# Patient Record
Sex: FEMALE | Race: WHITE | Employment: PART TIME
[De-identification: names, ages, dates, MRNs, and addresses within clinical notes are randomized per-mention and may not be internally consistent; named-entity substitution may affect disease eponyms.]

---

## 2020-02-08 ENCOUNTER — HOSPITAL (OUTPATIENT)
Dept: OTHER | Age: 35
End: 2020-02-08

## 2020-02-08 LAB
ABO + RH BLD: NORMAL
ALBUMIN SERPL-MCNC: 3.6 G/DL (ref 3.6–5.1)
ALBUMIN/GLOB SERPL: 0.9 {RATIO} (ref 1–2.4)
ALP SERPL-CCNC: 78 UNITS/L (ref 45–117)
ALT SERPL-CCNC: 20 UNITS/L
ANALYZER ANC (IANC): ABNORMAL
ANION GAP SERPL CALC-SCNC: 10 MMOL/L (ref 10–20)
AST SERPL-CCNC: 13 UNITS/L
BASOPHILS # BLD: 0 K/MCL (ref 0–0.3)
BASOPHILS NFR BLD: 1 %
BILIRUB SERPL-MCNC: 0.2 MG/DL (ref 0.2–1)
BLOOD BANK CMNT PATIENT-IMP: NORMAL
BUN SERPL-MCNC: 10 MG/DL (ref 6–20)
BUN/CREAT SERPL: 21 (ref 7–25)
CALCIUM SERPL-MCNC: 8.5 MG/DL (ref 8.4–10.2)
CHLORIDE SERPL-SCNC: 109 MMOL/L (ref 98–107)
CO2 SERPL-SCNC: 25 MMOL/L (ref 21–32)
CREAT SERPL-MCNC: 0.47 MG/DL (ref 0.51–0.95)
DIFFERENTIAL METHOD BLD: ABNORMAL
EOSINOPHIL # BLD: 0.3 K/MCL (ref 0.1–0.5)
EOSINOPHIL NFR BLD: 4 %
ERYTHROCYTE [DISTWIDTH] IN BLOOD: 20 % (ref 11–15)
GLOBULIN SER-MCNC: 4 G/DL (ref 2–4)
GLUCOSE SERPL-MCNC: 94 MG/DL (ref 65–99)
HCG POINT OF CARE (5HGRST): NEGATIVE
HCT VFR BLD CALC: 22.3 % (ref 36–46.5)
HGB BLD-MCNC: 5.8 G/DL (ref 12–15.5)
HGB BLD-MCNC: ABNORMAL G/DL
HGB BLD-MCNC: ABNORMAL G/DL
IMM GRANULOCYTES # BLD AUTO: 0 K/MCL (ref 0–0.2)
IMM GRANULOCYTES NFR BLD: 0 %
LYMPHOCYTES # BLD: 2.4 K/MCL (ref 1–4.8)
LYMPHOCYTES NFR BLD: 38 %
MCH RBC QN AUTO: 15.2 PG (ref 26–34)
MCHC RBC AUTO-ENTMCNC: 26 G/DL (ref 32–36.5)
MCV RBC AUTO: 58.5 FL (ref 78–100)
MONOCYTES # BLD: 0.6 K/MCL (ref 0.3–0.9)
MONOCYTES NFR BLD: 10 %
NEUTROPHILS # BLD: 3 K/MCL (ref 1.8–7.7)
NEUTROPHILS NFR BLD: 47 %
NEUTS SEG NFR BLD: ABNORMAL %
NRBC (NRBCRE): 0 /100 WBC
NUM BPU REQUESTED: 1
PLATELET # BLD: 388 K/MCL (ref 140–450)
POTASSIUM SERPL-SCNC: 3.7 MMOL/L (ref 3.4–5.1)
PROT SERPL-MCNC: 7.6 G/DL (ref 6.4–8.2)
RBC # BLD: 3.81 MIL/MCL (ref 4–5.2)
SODIUM SERPL-SCNC: 140 MMOL/L (ref 135–145)
WBC # BLD: 6.3 K/MCL (ref 4.2–11)

## 2020-02-08 PROCEDURE — 99284 EMERGENCY DEPT VISIT MOD MDM: CPT | Performed by: EMERGENCY MEDICINE

## 2020-02-09 LAB
ABO + RH BLD: NORMAL
BLD GP AB SCN SERPL QL: NEGATIVE
BLD PROD TYP BPU: NORMAL
BLD UNIT ID BPU: NORMAL
CROSSMATCH EXPIRE: NORMAL
MAJ XM SERPL-IMP: NORMAL
NUM BPU REQUESTED: 1
STATUS OF UNIT: NORMAL
TRANSFUSION STATUS: NORMAL
UNIT DIVISION: 0

## 2021-03-27 ENCOUNTER — TELEPHONE (OUTPATIENT)
Dept: SCHEDULING | Age: 36
End: 2021-03-27

## 2021-08-05 ENCOUNTER — TELEPHONE (OUTPATIENT)
Dept: FAMILY MEDICINE CLINIC | Facility: CLINIC | Age: 36
End: 2021-08-05

## 2021-08-05 ENCOUNTER — OFFICE VISIT (OUTPATIENT)
Dept: FAMILY MEDICINE CLINIC | Facility: CLINIC | Age: 36
End: 2021-08-05
Payer: COMMERCIAL

## 2021-08-05 VITALS
DIASTOLIC BLOOD PRESSURE: 60 MMHG | BODY MASS INDEX: 25.85 KG/M2 | OXYGEN SATURATION: 100 % | HEART RATE: 88 BPM | TEMPERATURE: 98 F | RESPIRATION RATE: 16 BRPM | SYSTOLIC BLOOD PRESSURE: 96 MMHG | HEIGHT: 59 IN | WEIGHT: 128.25 LBS

## 2021-08-05 DIAGNOSIS — D50.9 IRON DEFICIENCY ANEMIA, UNSPECIFIED IRON DEFICIENCY ANEMIA TYPE: ICD-10-CM

## 2021-08-05 DIAGNOSIS — N92.6 IRREGULAR MENSES: Primary | ICD-10-CM

## 2021-08-05 PROCEDURE — 3008F BODY MASS INDEX DOCD: CPT | Performed by: FAMILY MEDICINE

## 2021-08-05 PROCEDURE — 3078F DIAST BP <80 MM HG: CPT | Performed by: FAMILY MEDICINE

## 2021-08-05 PROCEDURE — 3074F SYST BP LT 130 MM HG: CPT | Performed by: FAMILY MEDICINE

## 2021-08-05 PROCEDURE — 99203 OFFICE O/P NEW LOW 30 MIN: CPT | Performed by: FAMILY MEDICINE

## 2021-08-05 RX ORDER — FERROUS SULFATE TAB EC 324 MG (65 MG FE EQUIVALENT) 324 (65 FE) MG
1 TABLET DELAYED RESPONSE ORAL 2 TIMES DAILY
COMMUNITY
Start: 2021-06-11

## 2021-08-05 RX ORDER — GLUCOSAMINE HCL 500 MG
TABLET ORAL
COMMUNITY

## 2021-08-05 RX ORDER — LEVOTHYROXINE SODIUM 0.07 MG/1
75 TABLET ORAL
Qty: 90 TABLET | Refills: 0 | Status: SHIPPED | OUTPATIENT
Start: 2021-08-05 | End: 2021-10-13

## 2021-08-05 RX ORDER — LEVOTHYROXINE SODIUM 0.1 MG/1
100 TABLET ORAL
Qty: 24 TABLET | Refills: 0 | Status: SHIPPED | OUTPATIENT
Start: 2021-08-05 | End: 2021-10-13

## 2021-08-05 RX ORDER — LEVOTHYROXINE SODIUM 0.1 MG/1
100 TABLET ORAL
COMMUNITY
Start: 2021-07-09 | End: 2021-08-05

## 2021-08-05 RX ORDER — LEVOTHYROXINE SODIUM 0.07 MG/1
TABLET ORAL
COMMUNITY
Start: 2021-07-02 | End: 2021-08-05

## 2021-08-11 NOTE — PROGRESS NOTES
[facility] Family [facility] Office Note  Chief Complaint:   Patient presents with:  Physical  New Patient      HPI:   This is a 30year old female coming in to establish care and would like to discuss her issues with her thyroid and her anemia.  He calculated from the following:    Height as of this encounter: 4' 11\" (1.499 m). Weight as of this encounter: 128 lb 4 oz (58.2 kg). Vital signs reviewed. Appears stated age, well groomed.     Physical Exam     GEN: Not in any acute distress, rj list on file for this patient.

## 2021-09-09 ENCOUNTER — OFFICE VISIT (OUTPATIENT)
Dept: FAMILY MEDICINE CLINIC | Facility: CLINIC | Age: 36
End: 2021-09-09
Payer: COMMERCIAL

## 2021-09-09 VITALS
HEART RATE: 90 BPM | TEMPERATURE: 99 F | BODY MASS INDEX: 27 KG/M2 | SYSTOLIC BLOOD PRESSURE: 108 MMHG | OXYGEN SATURATION: 100 % | DIASTOLIC BLOOD PRESSURE: 64 MMHG | RESPIRATION RATE: 16 BRPM | WEIGHT: 131.5 LBS

## 2021-09-09 DIAGNOSIS — R10.32 LLQ PAIN: ICD-10-CM

## 2021-09-09 DIAGNOSIS — N83.209 CYST OF OVARY, UNSPECIFIED LATERALITY: ICD-10-CM

## 2021-09-09 DIAGNOSIS — D75.838 REACTIVE THROMBOCYTOSIS: ICD-10-CM

## 2021-09-09 DIAGNOSIS — D50.9 IRON DEFICIENCY ANEMIA, UNSPECIFIED IRON DEFICIENCY ANEMIA TYPE: ICD-10-CM

## 2021-09-09 DIAGNOSIS — R53.83 OTHER FATIGUE: Primary | ICD-10-CM

## 2021-09-09 LAB
BASOPHILS # BLD AUTO: 0.07 X10(3) UL (ref 0–0.2)
BASOPHILS NFR BLD AUTO: 0.8 %
EOSINOPHIL # BLD AUTO: 0.27 X10(3) UL (ref 0–0.7)
EOSINOPHIL NFR BLD AUTO: 3 %
ERYTHROCYTE [DISTWIDTH] IN BLOOD BY AUTOMATED COUNT: 20.8 %
HCT VFR BLD AUTO: 35.8 %
HGB BLD-MCNC: 10 G/DL
IMM GRANULOCYTES # BLD AUTO: 0.02 X10(3) UL (ref 0–1)
IMM GRANULOCYTES NFR BLD: 0.2 %
LYMPHOCYTES # BLD AUTO: 2.55 X10(3) UL (ref 1–4)
LYMPHOCYTES NFR BLD AUTO: 28.8 %
MCH RBC QN AUTO: 19 PG (ref 26–34)
MCHC RBC AUTO-ENTMCNC: 27.9 G/DL (ref 31–37)
MCV RBC AUTO: 68.1 FL
MONOCYTES # BLD AUTO: 0.84 X10(3) UL (ref 0.1–1)
MONOCYTES NFR BLD AUTO: 9.5 %
NEUTROPHILS # BLD AUTO: 5.11 X10 (3) UL (ref 1.5–7.7)
NEUTROPHILS # BLD AUTO: 5.11 X10(3) UL (ref 1.5–7.7)
NEUTROPHILS NFR BLD AUTO: 57.7 %
PLATELET # BLD AUTO: 469 10(3)UL (ref 150–450)
RBC # BLD AUTO: 5.26 X10(6)UL
WBC # BLD AUTO: 8.9 X10(3) UL (ref 4–11)

## 2021-09-09 PROCEDURE — 3078F DIAST BP <80 MM HG: CPT | Performed by: FAMILY MEDICINE

## 2021-09-09 PROCEDURE — 99214 OFFICE O/P EST MOD 30 MIN: CPT | Performed by: FAMILY MEDICINE

## 2021-09-09 PROCEDURE — 3074F SYST BP LT 130 MM HG: CPT | Performed by: FAMILY MEDICINE

## 2021-09-09 PROCEDURE — 85025 COMPLETE CBC W/AUTO DIFF WBC: CPT | Performed by: FAMILY MEDICINE

## 2021-09-09 NOTE — PROGRESS NOTES
[address] [facility] Family Medicine Office Note  Chief Complaint:   Patient presents with:   Follow - Up      HPI:   This is a 30year old female coming in for follow up   - Hx of ovarian cysts - has not had a recent US   - Hx of IUI treatment in the past mass index is 26.56 kg/m² as calculated from the following:    Height as of 8/5/21: 4' 11\" (1.499 m). Weight as of this encounter: 131 lb 8 oz (59.6 kg). Vital signs reviewed. Appears stated age, well groomed.   Physical Exam     GEN: Not in any acute complications, allergies, or worsening or changing symptoms. Patient is to call with any side effects or complications from the treatments as a result of today. Problem List:  There is no problem list on file for this patient.

## 2021-09-17 ENCOUNTER — OFFICE VISIT (OUTPATIENT)
Dept: FAMILY MEDICINE CLINIC | Facility: CLINIC | Age: 36
End: 2021-09-17
Payer: COMMERCIAL

## 2021-09-17 VITALS
HEART RATE: 102 BPM | DIASTOLIC BLOOD PRESSURE: 56 MMHG | TEMPERATURE: 98 F | OXYGEN SATURATION: 99 % | BODY MASS INDEX: 27 KG/M2 | RESPIRATION RATE: 18 BRPM | WEIGHT: 133.25 LBS | SYSTOLIC BLOOD PRESSURE: 88 MMHG

## 2021-09-17 DIAGNOSIS — N92.1 MENORRHAGIA WITH IRREGULAR CYCLE: Primary | ICD-10-CM

## 2021-09-17 DIAGNOSIS — D50.9 IRON DEFICIENCY ANEMIA, UNSPECIFIED IRON DEFICIENCY ANEMIA TYPE: ICD-10-CM

## 2021-09-17 PROCEDURE — 99213 OFFICE O/P EST LOW 20 MIN: CPT | Performed by: FAMILY MEDICINE

## 2021-09-17 PROCEDURE — 3074F SYST BP LT 130 MM HG: CPT | Performed by: FAMILY MEDICINE

## 2021-09-17 PROCEDURE — 3078F DIAST BP <80 MM HG: CPT | Performed by: FAMILY MEDICINE

## 2021-09-17 RX ORDER — MEDROXYPROGESTERONE ACETATE 10 MG/1
10 TABLET ORAL DAILY
Qty: 10 TABLET | Refills: 0 | Status: SHIPPED | OUTPATIENT
Start: 2021-09-17 | End: 2021-10-06

## 2021-09-17 NOTE — PATIENT INSTRUCTIONS
Rx Provera 10 mg once daily X 10 days   Stay well hydrated   If your bleeding does not stop in 5-7 days and you continue to have a very heavy flow you may need a D & C  Follow up with your OB/Gyne / infertility specialist - make appt with specialist (ref prescribed.            General care  · Get plenty of rest if you tire easily. Avoid heavy exertion. · To help ease pain or cramping, try using a heating pad on the lower belly or back. A warm bath may also help.     Follow-up care  Follow up with your heal

## 2021-09-19 NOTE — PROGRESS NOTES
[address] [facility] Family [facility] Office Note  Chief Complaint:   Patient presents with:  Menstrual Problem      HPI:   This is a 30year old female coming in with excessive bleeding.   Patient notes that her period started on September 3 and initially w tablet (100 mcg total) by mouth twice a week.  SAT & SUN 24 tablet 0      Counseling given: Not Answered       REVIEW OF SYSTEMS:     All other review of systems are negative except for as mentioned in the HPI      EXAM:   BP (!) 88/56   Pulse 102   Temp 98 Patient is in agreement with plan. If the patient has any questions, the patient knows how to contact us at any time.        Meds & Refills for this Visit  Requested Prescriptions     Signed Prescriptions Disp Refills   • medroxyPROGESTERone Acetate 10 MG O

## 2021-10-01 ENCOUNTER — TELEPHONE (OUTPATIENT)
Dept: FAMILY MEDICINE CLINIC | Facility: CLINIC | Age: 36
End: 2021-10-01

## 2021-10-01 NOTE — TELEPHONE ENCOUNTER
----- Message from Florence Rebolledo RN sent at 8/5/2021 12:06 PM CDT -----  Regarding: recall  Recheck CBC

## 2021-10-03 NOTE — TELEPHONE ENCOUNTER
Does not need CBC checked at this time. As long as she has no symptoms (no palpitations, no dizziness or drowsiness, no shortness of breath), and continues to take her Iron supplement twice daily she is ok for now.  She is probably inquiring because she saw

## 2021-10-05 ENCOUNTER — TELEPHONE (OUTPATIENT)
Dept: FAMILY MEDICINE CLINIC | Facility: CLINIC | Age: 36
End: 2021-10-05

## 2021-10-05 NOTE — TELEPHONE ENCOUNTER
Patient states is weak and did not go to work due to weakness.     Future Appointments   Date Time Provider Hailey Latham   10/6/2021  9:30 AM DEANGELO Quintana [facility]iel   10/14/2021  3:00 PM Katia Gandhi   10/18/2021  4:00 PM

## 2021-10-05 NOTE — TELEPHONE ENCOUNTER
Patient called stating that her period came back as heavy as before. She doesn't know what to do in the mean time. Her Ultrasound is schedule for 10/14/21.     Please call back #[phone]

## 2021-10-06 ENCOUNTER — OFFICE VISIT (OUTPATIENT)
Dept: FAMILY MEDICINE CLINIC | Facility: CLINIC | Age: 36
End: 2021-10-06

## 2021-10-06 VITALS
WEIGHT: 133.63 LBS | RESPIRATION RATE: 20 BRPM | SYSTOLIC BLOOD PRESSURE: 110 MMHG | TEMPERATURE: 98 F | HEART RATE: 88 BPM | DIASTOLIC BLOOD PRESSURE: 78 MMHG | BODY MASS INDEX: 27 KG/M2

## 2021-10-06 DIAGNOSIS — N92.1 MENORRHAGIA WITH IRREGULAR CYCLE: Primary | ICD-10-CM

## 2021-10-06 DIAGNOSIS — D50.9 IRON DEFICIENCY ANEMIA, UNSPECIFIED IRON DEFICIENCY ANEMIA TYPE: ICD-10-CM

## 2021-10-06 DIAGNOSIS — R53.83 OTHER FATIGUE: ICD-10-CM

## 2021-10-06 PROCEDURE — 3078F DIAST BP <80 MM HG: CPT | Performed by: NURSE PRACTITIONER

## 2021-10-06 PROCEDURE — 99213 OFFICE O/P EST LOW 20 MIN: CPT | Performed by: NURSE PRACTITIONER

## 2021-10-06 PROCEDURE — 3074F SYST BP LT 130 MM HG: CPT | Performed by: NURSE PRACTITIONER

## 2021-10-06 RX ORDER — MEDROXYPROGESTERONE ACETATE 10 MG/1
10 TABLET ORAL DAILY
Qty: 10 TABLET | Refills: 0 | Status: SHIPPED | OUTPATIENT
Start: 2021-10-06 | End: 2021-10-16

## 2021-10-06 NOTE — PATIENT INSTRUCTIONS
Begin taking oral contraceptive as prescribed for the next 10 days   CBC to be done at [facility].   Continue taking IRON as prescribed   ER precautions for worsening symptoms.    Follow up with OB GYN after US   Call the office as needed with any questions or c count to be sure your anemia has been fixed.   Call 911  Call 911 or seek immediate medical care if any of these occur:  · Shortness of breath or chest pain  · Dizziness or fainting  · Vomiting blood or passing red or black-colored stool   StayWell last rev

## 2021-10-06 NOTE — PROGRESS NOTES
Subjective:   Patient ID: Rosalia Reis is a 30year old female. Patient presents to the clinic today for evaluation of irregular menses. Patient with history of irregular menses with heavy bleeding and iron deficiency anemia.   States last normal period Physical Exam  Vitals and nursing note reviewed. Constitutional:       Appearance: Normal appearance. Cardiovascular:      Rate and Rhythm: Normal rate.    Pulmonary:      Effort: Pulmonary effort is normal.   Abdominal:      Palpations: Abdomen is so

## 2021-10-12 ENCOUNTER — TELEPHONE (OUTPATIENT)
Dept: FAMILY MEDICINE CLINIC | Facility: CLINIC | Age: 36
End: 2021-10-12

## 2021-10-12 NOTE — TELEPHONE ENCOUNTER
Last refilled on 08/05/2021 for # 90 with 0 rf. Last labs 05/10/2021. Last seen on 10/06/2021. Thank you.

## 2021-10-12 NOTE — TELEPHONE ENCOUNTER
Patient called requesting refills for:    Levothyroxine Sodium 75 MCG Oral Tab 90 tablet     Levothyroxine Sodium 100 MCG Oral Tab 24      [id number] - Adela Ryan  [address] AT [facility] OF [address], [phone], [phone]

## 2021-10-13 RX ORDER — LEVOTHYROXINE SODIUM 0.07 MG/1
75 TABLET ORAL
Qty: 90 TABLET | Refills: 0 | Status: SHIPPED | OUTPATIENT
Start: 2021-10-13 | End: 2022-01-11

## 2021-10-13 RX ORDER — LEVOTHYROXINE SODIUM 0.1 MG/1
100 TABLET ORAL
Qty: 24 TABLET | Refills: 0 | Status: SHIPPED | OUTPATIENT
Start: 2021-10-14 | End: 2022-01-12

## 2021-11-09 ENCOUNTER — TELEPHONE (OUTPATIENT)
Dept: FAMILY MEDICINE CLINIC | Facility: CLINIC | Age: 36
End: 2021-11-09

## 2022-09-07 ENCOUNTER — OFFICE VISIT (OUTPATIENT)
Dept: FAMILY MEDICINE CLINIC | Facility: CLINIC | Age: 37
End: 2022-09-07
Payer: COMMERCIAL

## 2022-09-07 VITALS
TEMPERATURE: 98 F | RESPIRATION RATE: 16 BRPM | WEIGHT: 127 LBS | HEIGHT: 58.75 IN | SYSTOLIC BLOOD PRESSURE: 108 MMHG | BODY MASS INDEX: 25.95 KG/M2 | DIASTOLIC BLOOD PRESSURE: 62 MMHG | HEART RATE: 97 BPM | OXYGEN SATURATION: 99 %

## 2022-09-07 DIAGNOSIS — R79.0 LOW FERRITIN LEVEL: ICD-10-CM

## 2022-09-07 DIAGNOSIS — D50.8 OTHER IRON DEFICIENCY ANEMIA: ICD-10-CM

## 2022-09-07 DIAGNOSIS — Z13.220 SCREENING FOR LIPID DISORDERS: ICD-10-CM

## 2022-09-07 DIAGNOSIS — E03.9 HYPOTHYROIDISM (ACQUIRED): ICD-10-CM

## 2022-09-07 DIAGNOSIS — E55.9 VITAMIN D DEFICIENCY: ICD-10-CM

## 2022-09-07 DIAGNOSIS — Z00.00 ANNUAL PHYSICAL EXAM: Primary | ICD-10-CM

## 2022-09-07 DIAGNOSIS — R53.83 OTHER FATIGUE: ICD-10-CM

## 2022-09-07 DIAGNOSIS — Z83.49 FAMILY HISTORY OF THYROID DISEASE: ICD-10-CM

## 2022-09-07 LAB
ALBUMIN SERPL-MCNC: 3.6 G/DL (ref 3.4–5)
ALBUMIN/GLOB SERPL: 0.9 {RATIO} (ref 1–2)
ALP LIVER SERPL-CCNC: 112 U/L
ALT SERPL-CCNC: 48 U/L
ANION GAP SERPL CALC-SCNC: 4 MMOL/L (ref 0–18)
AST SERPL-CCNC: 23 U/L (ref 15–37)
BASOPHILS # BLD AUTO: 0.07 X10(3) UL (ref 0–0.2)
BASOPHILS NFR BLD AUTO: 0.8 %
BILIRUB SERPL-MCNC: 0.2 MG/DL (ref 0.1–2)
BUN BLD-MCNC: 9 MG/DL (ref 7–18)
CALCIUM BLD-MCNC: 8.9 MG/DL (ref 8.5–10.1)
CHLORIDE SERPL-SCNC: 110 MMOL/L (ref 98–112)
CHOLEST SERPL-MCNC: 164 MG/DL (ref ?–200)
CO2 SERPL-SCNC: 23 MMOL/L (ref 21–32)
CREAT BLD-MCNC: 0.63 MG/DL
DEPRECATED HBV CORE AB SER IA-ACNC: 4.3 NG/ML
EOSINOPHIL # BLD AUTO: 0.38 X10(3) UL (ref 0–0.7)
EOSINOPHIL NFR BLD AUTO: 4.2 %
ERYTHROCYTE [DISTWIDTH] IN BLOOD BY AUTOMATED COUNT: 14.6 %
EST. AVERAGE GLUCOSE BLD GHB EST-MCNC: 111 MG/DL (ref 68–126)
FASTING PATIENT LIPID ANSWER: NO
FASTING STATUS PATIENT QL REPORTED: NO
FOLATE SERPL-MCNC: 18.4 NG/ML (ref 8.7–?)
GFR SERPLBLD BASED ON 1.73 SQ M-ARVRAT: 119 ML/MIN/1.73M2 (ref 60–?)
GLOBULIN PLAS-MCNC: 4.1 G/DL (ref 2.8–4.4)
GLUCOSE BLD-MCNC: 113 MG/DL (ref 70–99)
HBA1C MFR BLD: 5.5 % (ref ?–5.7)
HCT VFR BLD AUTO: 36.5 %
HDLC SERPL-MCNC: 52 MG/DL (ref 40–59)
HGB BLD-MCNC: 11.2 G/DL
IMM GRANULOCYTES # BLD AUTO: 0.02 X10(3) UL (ref 0–1)
IMM GRANULOCYTES NFR BLD: 0.2 %
IRON SATN MFR SERPL: 4 %
IRON SERPL-MCNC: 23 UG/DL
LDLC SERPL CALC-MCNC: 102 MG/DL (ref ?–100)
LYMPHOCYTES # BLD AUTO: 2.5 X10(3) UL (ref 1–4)
LYMPHOCYTES NFR BLD AUTO: 27.4 %
MCH RBC QN AUTO: 24.5 PG (ref 26–34)
MCHC RBC AUTO-ENTMCNC: 30.7 G/DL (ref 31–37)
MCV RBC AUTO: 79.9 FL
MONOCYTES # BLD AUTO: 0.77 X10(3) UL (ref 0.1–1)
MONOCYTES NFR BLD AUTO: 8.4 %
NEUTROPHILS # BLD AUTO: 5.39 X10 (3) UL (ref 1.5–7.7)
NEUTROPHILS # BLD AUTO: 5.39 X10(3) UL (ref 1.5–7.7)
NEUTROPHILS NFR BLD AUTO: 59 %
NONHDLC SERPL-MCNC: 112 MG/DL (ref ?–130)
OSMOLALITY SERPL CALC.SUM OF ELEC: 283 MOSM/KG (ref 275–295)
PLATELET # BLD AUTO: 408 10(3)UL (ref 150–450)
POTASSIUM SERPL-SCNC: 3.5 MMOL/L (ref 3.5–5.1)
PROT SERPL-MCNC: 7.7 G/DL (ref 6.4–8.2)
RBC # BLD AUTO: 4.57 X10(6)UL
SODIUM SERPL-SCNC: 137 MMOL/L (ref 136–145)
T4 FREE SERPL-MCNC: 1 NG/DL (ref 0.8–1.7)
TIBC SERPL-MCNC: 575 UG/DL (ref 240–450)
TRANSFERRIN SERPL-MCNC: 386 MG/DL (ref 200–360)
TRIGL SERPL-MCNC: 49 MG/DL (ref 30–149)
TSI SER-ACNC: 7.07 MIU/ML (ref 0.36–3.74)
VIT B12 SERPL-MCNC: 505 PG/ML (ref 193–986)
VIT D+METAB SERPL-MCNC: 57.9 NG/ML (ref 30–100)
VLDLC SERPL CALC-MCNC: 8 MG/DL (ref 0–30)
WBC # BLD AUTO: 9.1 X10(3) UL (ref 4–11)

## 2022-09-07 PROCEDURE — 99395 PREV VISIT EST AGE 18-39: CPT | Performed by: FAMILY MEDICINE

## 2022-09-07 PROCEDURE — 82607 VITAMIN B-12: CPT | Performed by: FAMILY MEDICINE

## 2022-09-07 PROCEDURE — 83540 ASSAY OF IRON: CPT | Performed by: FAMILY MEDICINE

## 2022-09-07 PROCEDURE — 82306 VITAMIN D 25 HYDROXY: CPT | Performed by: FAMILY MEDICINE

## 2022-09-07 PROCEDURE — 3008F BODY MASS INDEX DOCD: CPT | Performed by: FAMILY MEDICINE

## 2022-09-07 PROCEDURE — 3078F DIAST BP <80 MM HG: CPT | Performed by: FAMILY MEDICINE

## 2022-09-07 PROCEDURE — 83036 HEMOGLOBIN GLYCOSYLATED A1C: CPT | Performed by: FAMILY MEDICINE

## 2022-09-07 PROCEDURE — 80061 LIPID PANEL: CPT | Performed by: FAMILY MEDICINE

## 2022-09-07 PROCEDURE — 99213 OFFICE O/P EST LOW 20 MIN: CPT | Performed by: FAMILY MEDICINE

## 2022-09-07 PROCEDURE — 84439 ASSAY OF FREE THYROXINE: CPT | Performed by: FAMILY MEDICINE

## 2022-09-07 PROCEDURE — 82728 ASSAY OF FERRITIN: CPT | Performed by: FAMILY MEDICINE

## 2022-09-07 PROCEDURE — 3074F SYST BP LT 130 MM HG: CPT | Performed by: FAMILY MEDICINE

## 2022-09-07 PROCEDURE — 83550 IRON BINDING TEST: CPT | Performed by: FAMILY MEDICINE

## 2022-09-07 PROCEDURE — 82746 ASSAY OF FOLIC ACID SERUM: CPT | Performed by: FAMILY MEDICINE

## 2022-09-07 PROCEDURE — 80050 GENERAL HEALTH PANEL: CPT | Performed by: FAMILY MEDICINE

## 2022-09-07 RX ORDER — LEVOTHYROXINE SODIUM 75 UG/1
CAPSULE ORAL
COMMUNITY

## 2022-09-07 RX ORDER — LEVOTHYROXINE SODIUM 112 UG/1
112 TABLET ORAL
Qty: 90 TABLET | Refills: 0 | Status: SHIPPED | OUTPATIENT
Start: 2022-09-07 | End: 2022-12-06

## 2022-09-07 RX ORDER — METHOCARBAMOL 750 MG/1
50000 TABLET ORAL
COMMUNITY
Start: 2022-06-09

## 2022-09-07 RX ORDER — NORGESTIMATE AND ETHINYL ESTRADIOL 0.25-0.035
1 KIT ORAL DAILY
COMMUNITY
Start: 2022-08-23

## 2022-09-08 ENCOUNTER — TELEPHONE (OUTPATIENT)
Dept: FAMILY MEDICINE CLINIC | Facility: CLINIC | Age: 37
End: 2022-09-08

## 2022-09-08 NOTE — TELEPHONE ENCOUNTER
----- Message from Daniel Peraza MD sent at 9/7/2022 11:25 PM CDT -----  Ferritin is extremely low. Will benefit from an Iron infusion. Referral placed to see Hematology - Dr Maribell Weir. Oral iron supplement is helping some but not fast enough. TSH is way off (75 mcg is not enough). Recommend increasing dose to 112 mcg once daily - Rx sent. Repeat TSH check in 4 weeks (place recall). Please inform patient of this plan. Provide phone number for Dr Maribell Weir. All other labs are normal.     Follow up in 1 month.        Robbin Medel, [address]  [address] [phone]

## 2022-09-09 NOTE — TELEPHONE ENCOUNTER
Left message to voicemail (per verbal release form consent, confirmed with identifying message.)  Advised patient to call office back [phone] - need to discuss note below.

## 2022-09-12 NOTE — TELEPHONE ENCOUNTER
Advised patient of Doctor's note below. Patient verbalized understanding. No further questions at this time.     Future Appointments   Date Time Provider Hailey Latham   10/7/2022  3:00 PM Daniel Landeros MD [facility]

## 2022-09-23 ENCOUNTER — TELEPHONE (OUTPATIENT)
Dept: FAMILY MEDICINE CLINIC | Facility: CLINIC | Age: 37
End: 2022-09-23

## 2022-09-23 DIAGNOSIS — D50.8 OTHER IRON DEFICIENCY ANEMIA: Primary | ICD-10-CM

## 2022-09-23 NOTE — TELEPHONE ENCOUNTER
Referral entered and faxed to Dr. Darian Nevarez office    Left message for the pt that the referral was faxed

## 2022-10-07 ENCOUNTER — OFFICE VISIT (OUTPATIENT)
Dept: FAMILY MEDICINE CLINIC | Facility: CLINIC | Age: 37
End: 2022-10-07
Payer: COMMERCIAL

## 2022-10-07 VITALS
SYSTOLIC BLOOD PRESSURE: 122 MMHG | OXYGEN SATURATION: 98 % | HEART RATE: 99 BPM | WEIGHT: 129.19 LBS | BODY MASS INDEX: 26.4 KG/M2 | DIASTOLIC BLOOD PRESSURE: 74 MMHG | HEIGHT: 58.75 IN | TEMPERATURE: 97 F

## 2022-10-07 DIAGNOSIS — R79.0 LOW FERRITIN LEVEL: ICD-10-CM

## 2022-10-07 DIAGNOSIS — Z23 NEED FOR VACCINATION: ICD-10-CM

## 2022-10-07 DIAGNOSIS — D50.9 IRON DEFICIENCY ANEMIA, UNSPECIFIED IRON DEFICIENCY ANEMIA TYPE: Primary | ICD-10-CM

## 2022-10-07 DIAGNOSIS — R82.998 FROTHY URINE: ICD-10-CM

## 2022-10-07 DIAGNOSIS — R10.9 LEFT LATERAL ABDOMINAL PAIN: ICD-10-CM

## 2022-10-07 DIAGNOSIS — E03.9 HYPOTHYROIDISM (ACQUIRED): ICD-10-CM

## 2022-10-07 LAB
APPEARANCE: CLEAR
BILIRUBIN: NEGATIVE
GLUCOSE (URINE DIPSTICK): NEGATIVE MG/DL
KETONES (URINE DIPSTICK): NEGATIVE MG/DL
LEUKOCYTES: NEGATIVE
MULTISTIX LOT#: ABNORMAL NUMERIC
NITRITE, URINE: NEGATIVE
PH, URINE: 6.5 (ref 4.5–8)
PROTEIN (URINE DIPSTICK): NEGATIVE MG/DL
SPECIFIC GRAVITY: 1.01 (ref 1–1.03)
T3FREE SERPL-MCNC: 2.71 PG/ML (ref 2.4–4.2)
T4 FREE SERPL-MCNC: 1.5 NG/DL (ref 0.8–1.7)
TSI SER-ACNC: 0.07 MIU/ML (ref 0.36–3.74)
URINE-COLOR: YELLOW
UROBILINOGEN,SEMI-QN: 0.2 MG/DL (ref 0–1.9)

## 2022-10-07 PROCEDURE — 87086 URINE CULTURE/COLONY COUNT: CPT | Performed by: FAMILY MEDICINE

## 2022-10-07 PROCEDURE — 84443 ASSAY THYROID STIM HORMONE: CPT | Performed by: FAMILY MEDICINE

## 2022-10-07 PROCEDURE — 84481 FREE ASSAY (FT-3): CPT | Performed by: FAMILY MEDICINE

## 2022-10-07 PROCEDURE — 84439 ASSAY OF FREE THYROXINE: CPT | Performed by: FAMILY MEDICINE

## 2022-10-10 ENCOUNTER — TELEPHONE (OUTPATIENT)
Dept: FAMILY MEDICINE CLINIC | Facility: CLINIC | Age: 37
End: 2022-10-10

## 2022-10-10 ENCOUNTER — MED REC SCAN ONLY (OUTPATIENT)
Dept: FAMILY MEDICINE CLINIC | Facility: CLINIC | Age: 37
End: 2022-10-10

## 2022-10-10 NOTE — TELEPHONE ENCOUNTER
----- Message from Mydhili Ed Ahumada, MD sent at 10/8/2022  9:06 AM CDT -----  Trace blood noted in the urine - ? Period ? UTI or miniscule kidney stones. Still awaiting culture. TSH is low. Implies overcorrection with current Levothyroxine dose. Go back down to 75 mcg once daily (currently on 112 mcg). Return for RN visit in 4 weeks for recheck TSH w/ reflex after being consistent on 75 mcg once daily.

## 2022-10-12 RX ORDER — LEVOTHYROXINE SODIUM 112 UG/1
112 TABLET ORAL
Qty: 90 TABLET | Refills: 0 | Status: SHIPPED | OUTPATIENT
Start: 2022-10-12 | End: 2023-01-10

## 2022-10-12 NOTE — TELEPHONE ENCOUNTER
Please see Patient message 10/12/2022    LOV 10/07/2022  Last labs 10/07/2022  Last refill on 09/07/2022, for #90 tabs, with 0 refills  levothyroxine 112 MCG Oral Tab  Thyroid Supplements Protocol Failed 10/12/2022 02:50 PM   Protocol Details  TSH value between 0.350 and 5.500 IU/ml    TSH test in past 12 months    Appointment in past 12 or next 3 months     No future appointments. Order(s) pending, please review. Thank you.

## 2022-11-07 RX ORDER — LEVOTHYROXINE SODIUM 75 UG/1
CAPSULE ORAL
Qty: 30 CAPSULE | Refills: 0 | OUTPATIENT
Start: 2022-11-07

## 2022-11-07 RX ORDER — LEVOTHYROXINE SODIUM 112 UG/1
112 TABLET ORAL
Qty: 90 TABLET | Refills: 0 | OUTPATIENT
Start: 2022-11-07 | End: 2023-02-05

## 2022-11-11 ENCOUNTER — PATIENT MESSAGE (OUTPATIENT)
Dept: FAMILY MEDICINE CLINIC | Facility: CLINIC | Age: 37
End: 2022-11-11

## 2022-11-12 NOTE — TELEPHONE ENCOUNTER
LOV 10/07/22  Last labs 10/07/2022  Last refill on 10/12/2022, for #90 tabs, with 0 refills  levothyroxine 112 MCG Oral Tab    Thyroid Supplements Protocol Failed 11/12/2022 08:14 AM   Protocol Details  TSH value between 0.350 and 5.500 IU/ml    TSH test in past 12 months    Appointment in past 12 or next 3 months         No future appointments. Order(s) pending, please review. Thank you.

## 2022-11-12 NOTE — TELEPHONE ENCOUNTER
From: Braulio Easton  To: Daniel Hurtado MD  Sent: 11/11/2022 8:23 PM CST  Subject: Clorinda Gilford dr Kym First it's me abhi i need levothyroxine refill still i take 112mcg

## 2022-11-14 RX ORDER — LEVOTHYROXINE SODIUM 112 UG/1
112 TABLET ORAL
Qty: 90 TABLET | Refills: 0 | Status: SHIPPED | OUTPATIENT
Start: 2022-11-14 | End: 2023-02-12

## 2022-11-21 RX ORDER — LEVOTHYROXINE SODIUM 75 UG/1
75 CAPSULE ORAL DAILY
Qty: 90 CAPSULE | Refills: 0 | Status: SHIPPED | OUTPATIENT
Start: 2022-11-21 | End: 2023-02-19

## 2022-11-21 NOTE — TELEPHONE ENCOUNTER
LOV: 10/7/22   Last Refill:     Looks like dosage change on 11/14/22?   Is pt still to be taking Levothyroxine 75mcg

## 2023-01-05 ENCOUNTER — OFFICE VISIT (OUTPATIENT)
Dept: FAMILY MEDICINE CLINIC | Facility: CLINIC | Age: 38
End: 2023-01-05
Payer: COMMERCIAL

## 2023-01-05 VITALS
DIASTOLIC BLOOD PRESSURE: 76 MMHG | TEMPERATURE: 99 F | BODY MASS INDEX: 26.93 KG/M2 | HEART RATE: 84 BPM | HEIGHT: 58.75 IN | SYSTOLIC BLOOD PRESSURE: 124 MMHG | WEIGHT: 131.81 LBS | OXYGEN SATURATION: 99 %

## 2023-01-05 DIAGNOSIS — E03.9 HYPOTHYROIDISM (ACQUIRED): Primary | ICD-10-CM

## 2023-01-05 DIAGNOSIS — D50.9 IRON DEFICIENCY ANEMIA, UNSPECIFIED IRON DEFICIENCY ANEMIA TYPE: ICD-10-CM

## 2023-01-05 DIAGNOSIS — R79.0 LOW FERRITIN LEVEL: ICD-10-CM

## 2023-01-05 PROCEDURE — 3078F DIAST BP <80 MM HG: CPT | Performed by: FAMILY MEDICINE

## 2023-01-05 PROCEDURE — 84443 ASSAY THYROID STIM HORMONE: CPT | Performed by: FAMILY MEDICINE

## 2023-01-05 PROCEDURE — 99213 OFFICE O/P EST LOW 20 MIN: CPT | Performed by: FAMILY MEDICINE

## 2023-01-05 PROCEDURE — 85025 COMPLETE CBC W/AUTO DIFF WBC: CPT | Performed by: FAMILY MEDICINE

## 2023-01-05 PROCEDURE — 82728 ASSAY OF FERRITIN: CPT | Performed by: FAMILY MEDICINE

## 2023-01-05 PROCEDURE — 84439 ASSAY OF FREE THYROXINE: CPT | Performed by: FAMILY MEDICINE

## 2023-01-05 PROCEDURE — 3008F BODY MASS INDEX DOCD: CPT | Performed by: FAMILY MEDICINE

## 2023-01-05 PROCEDURE — 3074F SYST BP LT 130 MM HG: CPT | Performed by: FAMILY MEDICINE

## 2023-01-06 ENCOUNTER — TELEPHONE (OUTPATIENT)
Dept: FAMILY MEDICINE CLINIC | Facility: CLINIC | Age: 38
End: 2023-01-06

## 2023-01-06 DIAGNOSIS — E03.9 HYPOTHYROIDISM (ACQUIRED): Primary | ICD-10-CM

## 2023-01-06 DIAGNOSIS — R79.0 LOW FERRITIN: ICD-10-CM

## 2023-01-06 DIAGNOSIS — E61.1 LOW IRON: ICD-10-CM

## 2023-01-06 DIAGNOSIS — Z51.81 MEDICATION MONITORING ENCOUNTER: ICD-10-CM

## 2023-01-06 LAB
BASOPHILS # BLD AUTO: 0.09 X10(3) UL (ref 0–0.2)
BASOPHILS NFR BLD AUTO: 1 %
DEPRECATED HBV CORE AB SER IA-ACNC: 3.1 NG/ML
EOSINOPHIL # BLD AUTO: 0.42 X10(3) UL (ref 0–0.7)
EOSINOPHIL NFR BLD AUTO: 4.6 %
ERYTHROCYTE [DISTWIDTH] IN BLOOD BY AUTOMATED COUNT: 14.1 %
HCT VFR BLD AUTO: 33.2 %
HGB BLD-MCNC: 10.1 G/DL
IMM GRANULOCYTES # BLD AUTO: 0.03 X10(3) UL (ref 0–1)
IMM GRANULOCYTES NFR BLD: 0.3 %
LYMPHOCYTES # BLD AUTO: 2.65 X10(3) UL (ref 1–4)
LYMPHOCYTES NFR BLD AUTO: 29.2 %
MCH RBC QN AUTO: 22.7 PG (ref 26–34)
MCHC RBC AUTO-ENTMCNC: 30.4 G/DL (ref 31–37)
MCV RBC AUTO: 74.6 FL
MONOCYTES # BLD AUTO: 0.71 X10(3) UL (ref 0.1–1)
MONOCYTES NFR BLD AUTO: 7.8 %
NEUTROPHILS # BLD AUTO: 5.19 X10 (3) UL (ref 1.5–7.7)
NEUTROPHILS # BLD AUTO: 5.19 X10(3) UL (ref 1.5–7.7)
NEUTROPHILS NFR BLD AUTO: 57.1 %
PLATELET # BLD AUTO: 442 10(3)UL (ref 150–450)
RBC # BLD AUTO: 4.45 X10(6)UL
T4 FREE SERPL-MCNC: 1.1 NG/DL (ref 0.8–1.7)
TSI SER-ACNC: 7.83 MIU/ML (ref 0.36–3.74)
WBC # BLD AUTO: 9.1 X10(3) UL (ref 4–11)

## 2023-01-06 RX ORDER — LEVOTHYROXINE SODIUM 0.1 MG/1
100 TABLET ORAL
Qty: 90 TABLET | Refills: 0 | Status: SHIPPED | OUTPATIENT
Start: 2023-01-06 | End: 2023-04-06

## 2023-01-06 NOTE — TELEPHONE ENCOUNTER
----- Message from Daniel Barber MD sent at 1/6/2023 10:06 AM CST -----  TSH is back and forth - she needs immediate follow up (she has been on 75 mcg since the change made in Nov and did not follow up in time). Please switch her to 100 mcg  once daily (Rx sent) and emphasize the need to return in 4 weeks for a TSH recheck. Her anemia has worsened when compared to her numbers 4 months ago. Ferritin is extremely low - at this time, considering we have tried oral supplementation and dietary changes, I recommend seeing a hematologist - she might be a good candidate for IV iron infusion. Please place referral for her to see Dr Ariana Willingham. This is could be what is interfering with her infertility as well.

## 2023-01-06 NOTE — TELEPHONE ENCOUNTER
Left message to voicemail (per verbal release form consent, confirmed with identifying message.)  Advised patient to call office back [phone] - need to discuss note below.

## 2023-01-09 NOTE — TELEPHONE ENCOUNTER
Advised patient of Doctor's note below. Patient verbalized understanding. No further questions at this time.     Future Appointments   Date Time Provider Hailey Latham   2/8/2023  3:00 PM [facility] NURSE [facility] sent to pt - referral contact info

## 2023-02-13 ENCOUNTER — HOSPITAL ENCOUNTER (OUTPATIENT)
Age: 38
Discharge: HOME | End: 2023-02-13
Payer: COMMERCIAL

## 2023-02-13 VITALS
DIASTOLIC BLOOD PRESSURE: 79 MMHG | SYSTOLIC BLOOD PRESSURE: 113 MMHG | RESPIRATION RATE: 18 BRPM | HEIGHT: 59 IN | WEIGHT: 138 LBS | OXYGEN SATURATION: 98 % | HEART RATE: 106 BPM | BODY MASS INDEX: 27.82 KG/M2 | TEMPERATURE: 98 F

## 2023-02-13 DIAGNOSIS — J02.9 PHARYNGITIS, UNSPECIFIED ETIOLOGY: Primary | ICD-10-CM

## 2023-02-13 LAB
S PYO AG THROAT QL: NEGATIVE
SARS-COV-2 RNA RESP QL NAA+PROBE: NOT DETECTED

## 2023-02-13 PROCEDURE — 99213 OFFICE O/P EST LOW 20 MIN: CPT | Performed by: NURSE PRACTITIONER

## 2023-02-13 PROCEDURE — 87880 STREP A ASSAY W/OPTIC: CPT | Performed by: NURSE PRACTITIONER

## 2023-02-13 PROCEDURE — U0002 COVID-19 LAB TEST NON-CDC: HCPCS | Performed by: NURSE PRACTITIONER

## 2023-02-13 NOTE — DISCHARGE INSTRUCTIONS
Tylenol and Motrin alternating. Use salt water gargles and throat lozenges as needed for pain. Follow-up with your primary care doctor as needed. Increase oral fluids.

## 2023-03-08 ENCOUNTER — OFFICE VISIT (OUTPATIENT)
Dept: FAMILY MEDICINE CLINIC | Facility: CLINIC | Age: 38
End: 2023-03-08
Payer: COMMERCIAL

## 2023-03-08 VITALS
BODY MASS INDEX: 26.28 KG/M2 | OXYGEN SATURATION: 100 % | DIASTOLIC BLOOD PRESSURE: 62 MMHG | TEMPERATURE: 98 F | HEART RATE: 90 BPM | HEIGHT: 59 IN | WEIGHT: 130.38 LBS | SYSTOLIC BLOOD PRESSURE: 98 MMHG

## 2023-03-08 DIAGNOSIS — R79.0 LOW FERRITIN: ICD-10-CM

## 2023-03-08 DIAGNOSIS — E03.8 OTHER SPECIFIED HYPOTHYROIDISM: Primary | ICD-10-CM

## 2023-03-08 DIAGNOSIS — D50.9 IRON DEFICIENCY ANEMIA, UNSPECIFIED IRON DEFICIENCY ANEMIA TYPE: ICD-10-CM

## 2023-03-08 LAB
T4 FREE SERPL-MCNC: 1.1 NG/DL (ref 0.8–1.7)
TSI SER-ACNC: 0.29 MIU/ML (ref 0.36–3.74)

## 2023-03-08 PROCEDURE — 84443 ASSAY THYROID STIM HORMONE: CPT | Performed by: FAMILY MEDICINE

## 2023-03-08 PROCEDURE — 3074F SYST BP LT 130 MM HG: CPT | Performed by: FAMILY MEDICINE

## 2023-03-08 PROCEDURE — 3078F DIAST BP <80 MM HG: CPT | Performed by: FAMILY MEDICINE

## 2023-03-08 PROCEDURE — 99213 OFFICE O/P EST LOW 20 MIN: CPT | Performed by: FAMILY MEDICINE

## 2023-03-08 PROCEDURE — 84439 ASSAY OF FREE THYROXINE: CPT | Performed by: FAMILY MEDICINE

## 2023-03-08 PROCEDURE — 3008F BODY MASS INDEX DOCD: CPT | Performed by: FAMILY MEDICINE

## 2023-03-09 ENCOUNTER — TELEPHONE (OUTPATIENT)
Dept: FAMILY MEDICINE CLINIC | Facility: CLINIC | Age: 38
End: 2023-03-09

## 2023-03-09 RX ORDER — LEVOTHYROXINE SODIUM 88 UG/1
88 TABLET ORAL
Qty: 90 TABLET | Refills: 0 | Status: SHIPPED | OUTPATIENT
Start: 2023-03-09 | End: 2023-06-07

## 2023-03-09 NOTE — TELEPHONE ENCOUNTER
Advised patient of DEANGELO's note below. Patient verbalized understanding. No further questions at this time.     Recall placed repeat thyroid in 6 weeks  Lab order in place

## 2023-03-09 NOTE — TELEPHONE ENCOUNTER
----- Message from DEANGELO Tucker sent at 3/9/2023  9:09 AM CST -----  Need to lower Levothyroxine dose. New Rx sent to pharmacy.  Needs repeat labs in 6 weeks

## 2023-03-14 ENCOUNTER — OFFICE VISIT (OUTPATIENT)
Dept: HEMATOLOGY/ONCOLOGY | Facility: HOSPITAL | Age: 38
End: 2023-03-14
Attending: INTERNAL MEDICINE
Payer: COMMERCIAL

## 2023-03-14 VITALS
BODY MASS INDEX: 26.08 KG/M2 | WEIGHT: 129.38 LBS | RESPIRATION RATE: 18 BRPM | HEART RATE: 99 BPM | HEIGHT: 58.9 IN | TEMPERATURE: 98 F | OXYGEN SATURATION: 99 % | DIASTOLIC BLOOD PRESSURE: 76 MMHG | SYSTOLIC BLOOD PRESSURE: 115 MMHG

## 2023-03-14 DIAGNOSIS — D50.0 IRON DEFICIENCY ANEMIA DUE TO CHRONIC BLOOD LOSS: Primary | ICD-10-CM

## 2023-03-14 DIAGNOSIS — E03.9 HYPOTHYROIDISM, UNSPECIFIED TYPE: ICD-10-CM

## 2023-03-14 PROCEDURE — 99205 OFFICE O/P NEW HI 60 MIN: CPT | Performed by: INTERNAL MEDICINE

## 2023-03-14 NOTE — PROGRESS NOTES
Education Record    Learner:  Patient    Disease / Diagnosis: Anemia    Barriers / Limitations:  None   Comments:    Method:  Discussion   Comments:    General Topics:  Medication, Side effects and symptom management and Plan of care reviewed   Comments:    Outcome:  Shows understanding   Comments:    Here for new consult- iron deficiency anemia. Very low energy. No shortness of breath. No abnormal bleeding. Irregular periods which she follows with ob/gyne for. Will set up for some IV iron. 160

## 2023-03-15 ENCOUNTER — TELEPHONE (OUTPATIENT)
Dept: HEMATOLOGY/ONCOLOGY | Facility: HOSPITAL | Age: 38
End: 2023-03-15

## 2023-03-15 NOTE — TELEPHONE ENCOUNTER
----- Message from Franco Carey RN sent at 3/15/2023  8:51 AM CDT -----  Hello-    Sorry for all of the confusion before about which iron product. I was told like 4 different things. She will need 2 doses, one week apart, of feraheme. It will be about 1hr appointment.     Thank you,  Rj

## 2023-03-21 ENCOUNTER — OFFICE VISIT (OUTPATIENT)
Dept: HEMATOLOGY/ONCOLOGY | Facility: HOSPITAL | Age: 38
End: 2023-03-21
Attending: INTERNAL MEDICINE
Payer: COMMERCIAL

## 2023-03-21 VITALS
BODY MASS INDEX: 26.33 KG/M2 | HEART RATE: 80 BPM | SYSTOLIC BLOOD PRESSURE: 99 MMHG | HEIGHT: 58.9 IN | OXYGEN SATURATION: 99 % | TEMPERATURE: 98 F | WEIGHT: 130.63 LBS | DIASTOLIC BLOOD PRESSURE: 63 MMHG | RESPIRATION RATE: 16 BRPM

## 2023-03-21 DIAGNOSIS — D50.0 IRON DEFICIENCY ANEMIA DUE TO CHRONIC BLOOD LOSS: Primary | ICD-10-CM

## 2023-03-21 PROCEDURE — 96374 THER/PROPH/DIAG INJ IV PUSH: CPT

## 2023-03-21 NOTE — PROGRESS NOTES
Education Record    Learner:  Patient    Disease / Diagnosis: ROSA    Barriers / Limitations:  None   Comments:    Method:  Brief focused   Comments:    General Topics:  Plan of care reviewed   Comments:    Outcome:  Shows understanding   Comments: Feraheme infused without any complications. Observed for half hour after infusion. Vital signs taken at the end of the 30-minute observation. Patient denied any complaints/issues. Discharged in good condition. Advised to call for any questions/concerns/issues.

## 2023-03-22 ENCOUNTER — APPOINTMENT (OUTPATIENT)
Dept: HEMATOLOGY/ONCOLOGY | Age: 38
End: 2023-03-22
Payer: COMMERCIAL

## 2023-03-28 ENCOUNTER — OFFICE VISIT (OUTPATIENT)
Dept: HEMATOLOGY/ONCOLOGY | Facility: HOSPITAL | Age: 38
End: 2023-03-28
Attending: INTERNAL MEDICINE
Payer: COMMERCIAL

## 2023-03-28 VITALS
RESPIRATION RATE: 16 BRPM | DIASTOLIC BLOOD PRESSURE: 65 MMHG | SYSTOLIC BLOOD PRESSURE: 105 MMHG | HEART RATE: 80 BPM | TEMPERATURE: 98 F | OXYGEN SATURATION: 99 %

## 2023-03-28 DIAGNOSIS — D50.0 IRON DEFICIENCY ANEMIA DUE TO CHRONIC BLOOD LOSS: Primary | ICD-10-CM

## 2023-03-28 PROCEDURE — 96374 THER/PROPH/DIAG INJ IV PUSH: CPT

## 2023-03-28 NOTE — PROGRESS NOTES
Education Record    Learner:  Patient    Disease / Diagnosis: feraheme infusion     Barriers / Limitations:  None   Comments:    Method:  Brief focused   Comments:    General Topics:  Plan of care reviewed   Comments:    Outcome:  Shows understanding   Comments: Tolerated well without issue. Vitals taken 30 minutes post infusion.

## 2023-04-20 ENCOUNTER — TELEPHONE (OUTPATIENT)
Dept: FAMILY MEDICINE CLINIC | Facility: CLINIC | Age: 38
End: 2023-04-20

## 2023-04-20 NOTE — TELEPHONE ENCOUNTER
Letter mailed to patient reminding her she is due for labs per pt reminder.     Lab Frequency Next Occurrence   TSH W REFLEX TO FREE T4 Once 02/06/2023         Trevon Reynoso RN  Daniel Rob Nurse  Repeat thyroid labs in 6 weeks

## 2023-07-17 ENCOUNTER — OFFICE VISIT (OUTPATIENT)
Dept: FAMILY MEDICINE CLINIC | Facility: CLINIC | Age: 38
End: 2023-07-17
Payer: COMMERCIAL

## 2023-07-17 VITALS
HEART RATE: 77 BPM | OXYGEN SATURATION: 97 % | RESPIRATION RATE: 16 BRPM | SYSTOLIC BLOOD PRESSURE: 96 MMHG | TEMPERATURE: 98 F | BODY MASS INDEX: 26.21 KG/M2 | HEIGHT: 59 IN | WEIGHT: 130 LBS | DIASTOLIC BLOOD PRESSURE: 56 MMHG

## 2023-07-17 DIAGNOSIS — E03.9 HYPOTHYROIDISM (ACQUIRED): Primary | ICD-10-CM

## 2023-07-17 DIAGNOSIS — H61.23 BILATERAL IMPACTED CERUMEN: ICD-10-CM

## 2023-07-17 LAB — TSI SER-ACNC: 0.38 MIU/ML (ref 0.36–3.74)

## 2023-07-17 PROCEDURE — 84443 ASSAY THYROID STIM HORMONE: CPT | Performed by: NURSE PRACTITIONER

## 2023-07-17 RX ORDER — LEVOTHYROXINE SODIUM 88 UG/1
TABLET ORAL
COMMUNITY
Start: 2023-07-12

## 2023-07-18 ENCOUNTER — TELEPHONE (OUTPATIENT)
Dept: FAMILY MEDICINE CLINIC | Facility: CLINIC | Age: 38
End: 2023-07-18

## 2023-07-18 DIAGNOSIS — E03.9 HYPOTHYROIDISM (ACQUIRED): Primary | ICD-10-CM

## 2023-07-18 RX ORDER — LEVOTHYROXINE SODIUM 88 UG/1
88 TABLET ORAL DAILY
Qty: 90 TABLET | Refills: 0 | Status: SHIPPED | OUTPATIENT
Start: 2023-07-18

## 2023-07-18 NOTE — TELEPHONE ENCOUNTER
----- Message from DEANGELO Ibrahim sent at 7/18/2023  7:57 AM CDT -----  TSH - thyroid is functioning normally, continue current dose of levothyroxine, repeat tsh in 3 months - order placed for October 2023

## 2023-07-18 NOTE — TELEPHONE ENCOUNTER
Endoscopy Discharge Summary      Admit Date: 8/23/2018    Discharge Date and Time:  8/23/2018 8:40 AM    Attending Physician: Dennis Jorge MD     Discharge Physician: Dennis Jorge MD     Principal Admitting Diagnoses: History of adenomatous polyp of colon         Discharge Diagnosis: The primary encounter diagnosis was History of adenomatous polyp of colon. Diagnoses of Family history of colon cancer, Special screening for malignant neoplasms, colon, and Polyp of colon, unspecified part of colon, unspecified type were also pertinent to this visit.     Discharged Condition: Good    Indication for Admission: History of adenomatous polyp of colon     Hospital Course: Patient was admitted for an inpatient procedure and tolerated the procedure well with no complications.    Significant Diagnostic Studies: Colonoscopy with cold biopsy polypectomy    Pathology (if any):  Specimen (12h ago, onward)    Start     Ordered    08/23/18 0837  Specimen to Pathology - Surgery  Once     Comments:  1. Sigmoid 1mm sessile polyp     Start Status   08/23/18 0837 Collected (08/23/18 0838)       08/23/18 0838          Estimated Blood Loss: 1 ml.    Discussed with: patient and family.    Disposition: Home.    Follow Up/Patient Instructions:   Current Discharge Medication List      CONTINUE these medications which have NOT CHANGED    Details   benazepril (LOTENSIN) 10 MG tablet TAKE ONE TABLET BY MOUTH ONCE DAILY  Qty: 90 tablet, Refills: 3      calcium carbonate-vitamin D3 (CALTRATE 600 + D) 600 mg(1,500mg) -400 unit Chew Take by mouth.        HUMALOG KWIKPEN INSULIN 100 unit/mL InPn pen INJECT 10 UNITS SUBCUTANEOUSLY THREE TIMES DAILY BEFORE  MEALS  Qty: 1 Box, Refills: 3      insulin glargine (LANTUS SOLOSTAR U-100 INSULIN) 100 unit/mL (3 mL) InPn pen Inject 30 Units into the skin once daily.  Qty: 30 mL, Refills: 11    Associated Diagnoses: Type 1 diabetes mellitus without complication      lovastatin (MEVACOR) 20 MG tablet  Attempted to call pt - pt not available at this time, advised pt to call office back - she v/u "TAKE ONE TABLET BY MOUTH ONCE DAILY AT  NIGHT.  Qty: 90 tablet, Refills: 3      metFORMIN (GLUCOPHAGE-XR) 500 MG 24 hr tablet TAKE FOUR TABLETS BY MOUTH ONCE DAILY  Qty: 120 tablet, Refills: 3    Comments: Please consider 90 day supplies to promote better adherence  Associated Diagnoses: Type 1 diabetes mellitus without complication      blood-glucose meter Misc Test blood sugar twice daily Dx: 250.00 diabetes mellitus  Qty: 1 each, Refills: 0    Associated Diagnoses: Type 2 diabetes mellitus without complication      FREESTYLE LANCETS 28 gauge lancets USE ONE  TO CHECK GLUCOSE THREE TIMES DAILY  Qty: 100 each, Refills: 11    Comments: Please consider 90 day supplies to promote better adherence      FREESTYLE LITE STRIPS Strp USE ONE STRIP TO CHECK GLUCOSE THREE TIMES DAILY  Qty: 100 each, Refills: 11    Comments: Please consider 90 day supplies to promote better adherence      pen needle, diabetic 31 gauge x 1/4" Ndle USE TO STICK INTO THE SKIN 5 TIMES DAILY  Qty: 200 each, Refills: 11    Comments: Please consider 90 day supplies to promote better adherence  Associated Diagnoses: Type 1 diabetes mellitus without complication             Discharge Procedure Orders   Diet general     Call MD for:  temperature >100.4     Call MD for:  persistent nausea and vomiting     Call MD for:  severe uncontrolled pain     Call MD for:  difficulty breathing, headache or visual disturbances     Call MD for:  redness, tenderness, or signs of infection (pain, swelling, redness, odor or green/yellow discharge around incision site)     Call MD for:  hives     Call MD for:  persistent dizziness or light-headedness     No dressing needed       Follow-up Information     Dennis Jorge MD. Call in 1 week.    Specialty:  Family Medicine  Why:  To receive pathology results.  Contact information:  [id number] [facility] [address] [phone]                   [email or web address]      "

## 2023-07-18 NOTE — TELEPHONE ENCOUNTER
Advised patient of APRN's note below and Rx sent today. Patient verbalized understanding. No further questions at this time.     Future Appointments   Date Time Provider Hailey Latham   10/19/2023  2:00 PM  [facility] [facility] EMG Floretta Finger     Future lab in place

## 2023-07-24 NOTE — TELEPHONE ENCOUNTER
LOV 08/05/2021  Last labs 05/10/2021  Last refill on (Historical), for #?, with ? refills  Levothyroxine Sodium 75 MCG Oral Tab    Last refill on (Historical), for #?, with ? refills  Levothyroxine Sodium 100 MCG Oral Tab    No future appointments.     Conf
24-Jul-2023 09:00

## 2023-10-19 ENCOUNTER — NURSE ONLY (OUTPATIENT)
Dept: FAMILY MEDICINE CLINIC | Facility: CLINIC | Age: 38
End: 2023-10-19
Payer: COMMERCIAL

## 2023-10-19 DIAGNOSIS — E03.9 HYPOTHYROIDISM (ACQUIRED): ICD-10-CM

## 2023-10-19 LAB — TSI SER-ACNC: 2.47 MIU/ML (ref 0.36–3.74)

## 2023-10-19 PROCEDURE — 84443 ASSAY THYROID STIM HORMONE: CPT | Performed by: NURSE PRACTITIONER

## 2023-10-19 NOTE — PROGRESS NOTES
Patient to clinic for labs per [facility] APRN  1 tubes drawn lt green 1 attempt  Patient left office in stable condition

## 2023-10-20 ENCOUNTER — TELEPHONE (OUTPATIENT)
Dept: FAMILY MEDICINE CLINIC | Facility: CLINIC | Age: 38
End: 2023-10-20

## 2023-10-20 NOTE — TELEPHONE ENCOUNTER
Spoke with patient - given DEANGELO Choudhury recommendations re: labs    TSH - normal thyroid with use of rx, continue 88mcg daily and plan on recheck in 1 year.  Rx sent with refills     Pt verbalized understanding

## 2023-10-20 NOTE — TELEPHONE ENCOUNTER
----- Message from DEANGELO Leonard sent at 10/20/2023  8:45 AM CDT -----  TSH - normal thyroid with use of rx, continue 88mcg daily and plan on recheck in 1 year.  Rx sent with refills

## 2023-11-02 ENCOUNTER — LAB SERVICES (OUTPATIENT)
Dept: LAB | Age: 38
End: 2023-11-02

## 2023-11-02 DIAGNOSIS — N83.209 UNSPECIFIED OVARIAN CYST, UNSPECIFIED SIDE: Primary | ICD-10-CM

## 2023-11-02 PROCEDURE — 36415 COLL VENOUS BLD VENIPUNCTURE: CPT | Performed by: INTERNAL MEDICINE

## 2023-11-02 PROCEDURE — 81503 ONCO (OVAR) FIVE PROTEINS: CPT | Performed by: CLINICAL MEDICAL LABORATORY

## 2023-11-07 LAB
CANCER AG125 SERPL-ACNC: 10
LABORATORY REPORT: NORMAL
MALIGNANCY ASSESSMENT, OVERA SCORE: NORMAL
MALIGNANCY RISK, PELVIC MASS, OVA1 SCORE: 3.9
MENOPAUSE STATUS: NORMAL

## 2024-04-01 ENCOUNTER — TELEPHONE (OUTPATIENT)
Dept: FAMILY MEDICINE CLINIC | Facility: CLINIC | Age: 39
End: 2024-04-01

## 2024-05-21 ENCOUNTER — PATIENT OUTREACH (OUTPATIENT)
Dept: FAMILY MEDICINE CLINIC | Facility: CLINIC | Age: 39
End: 2024-05-21

## 2024-08-07 ENCOUNTER — HOSPITAL ENCOUNTER (OUTPATIENT)
Age: 39
Discharge: ANOTHER HOSPITAL | End: 2024-08-07
Payer: COMMERCIAL

## 2024-08-07 VITALS
OXYGEN SATURATION: 98 % | SYSTOLIC BLOOD PRESSURE: 123 MMHG | RESPIRATION RATE: 18 BRPM | BODY MASS INDEX: 27 KG/M2 | TEMPERATURE: 98 F | HEART RATE: 84 BPM | WEIGHT: 132 LBS | DIASTOLIC BLOOD PRESSURE: 92 MMHG

## 2024-08-07 DIAGNOSIS — R10.30 LOWER ABDOMINAL PAIN: Primary | ICD-10-CM

## 2024-08-07 PROCEDURE — 99215 OFFICE O/P EST HI 40 MIN: CPT | Performed by: NURSE PRACTITIONER

## 2024-08-08 NOTE — ED INITIAL ASSESSMENT (HPI)
Pt with c/o lower abdominal pain that started this afternoon.  Pt denies any nausea, vomiting or diarrhea.  Pt denies fevers.

## 2024-08-08 NOTE — ED PROVIDER NOTES
Patient Seen in: [facility]      History     Chief Complaint   Patient presents with    Abdomen/Flank Pain     Stated Complaint: lower abdomin pain    Subjective:   38-year-old female presents with complaint of severe lower abdominal pain that began this afternoon.  Pain is radiating from the right lower quadrant all the way across to the left lower quadrant.  Patient states she has not had pain like this before.  Last BM was today.  Normal formed and brown.  Patient has not taken any medication for the pain.  LMP 7/18/24- unsure if pregnant.     Patient denies fever, chills, vomiting, nausea, chest pain, shortness of breath, dizziness, dysuria, frequency, hematuria, back pain.      The history is provided by the patient and a relative.           Objective:   Past Medical History:    Anemia    Thyroid disease              History reviewed. No pertinent surgical history.             Social History     Socioeconomic History    Marital status:    Tobacco Use    Smoking status: Never    Smokeless tobacco: Never   Vaping Use    Vaping status: Never Used   Substance and Sexual Activity    Alcohol use: Never    Drug use: Never              Review of Systems   Constitutional:  Negative for chills and fever.   Gastrointestinal:  Positive for abdominal pain. Negative for abdominal distention, nausea and vomiting.   Genitourinary:  Negative for dysuria, flank pain and hematuria.   Skin:  Negative for rash.   Neurological:  Negative for dizziness and light-headedness.       Positive for stated Chief Complaint: Abdomen/Flank Pain    Other systems are as noted in HPI.  Constitutional and vital signs reviewed.      All other systems reviewed and negative except as noted above.    Physical Exam     ED Triage Vitals [08/07/24 1933]   BP (!) 123/92   Pulse 84   Resp 18   Temp 97.6 °F (36.4 °C)   Temp src Temporal   SpO2 98 %   O2 Device None (Room air)       Current Vitals:   Vital Signs  BP: (!) 123/92  Pulse:  84  Resp: 18  Temp: 97.6 °F (36.4 °C)  Temp src: Temporal    Oxygen Therapy  SpO2: 98 %  O2 Device: None (Room air)            Physical Exam  Constitutional:       General: She is in acute distress.      Appearance: She is well-developed. She is not diaphoretic.   HENT:      Head: Normocephalic and atraumatic.   Cardiovascular:      Rate and Rhythm: Normal rate and regular rhythm.   Pulmonary:      Effort: Pulmonary effort is normal.      Breath sounds: Normal breath sounds.   Abdominal:      General: Abdomen is flat. Bowel sounds are normal. There are no signs of injury.      Palpations: Abdomen is soft.      Tenderness: There is abdominal tenderness in the right lower quadrant, periumbilical area and left lower quadrant. There is no right CVA tenderness or left CVA tenderness.   Skin:     General: Skin is warm and dry.   Neurological:      Mental Status: She is alert.   Psychiatric:         Mood and Affect: Mood is anxious.               ED Course   Labs Reviewed - No data to display      MDM                Medical Decision Making  38-year-old female presents with complaint of severe lower abdominal pain that began this afternoon.  Diff dx include appendicitis vs ovarian torsion vs ectopic pregnancy vs diverticulitis vs other.  On exam,pt is very uncomfortable, very tender lower abdomen- right/left quadrants, vitals are normal at this time.   Unable to provide urine sample.   Reviewed with the patient the limitations of this immediate care, including inability to perform advanced imaging at this hour. For this reason I recommend the patient go to the emergency department for further evaluation and care.   Pt's relative who is in the room prefers to drive pt.   Plan to go to [place] ED.   If symptoms worsen, stop, pull over and call 911.     Amount and/or Complexity of Data Reviewed  External Data Reviewed: notes.        Disposition and Plan     Clinical Impression:  1. Lower abdominal pain          Disposition:  Ic to ed  8/7/2024  7:40 pm    Follow-up:  No follow-up provider specified.        Medications Prescribed:  Discharge Medication List as of 8/7/2024  7:43 PM

## 2024-08-14 ENCOUNTER — OFFICE VISIT (OUTPATIENT)
Dept: FAMILY MEDICINE CLINIC | Facility: CLINIC | Age: 39
End: 2024-08-14
Payer: COMMERCIAL

## 2024-08-14 VITALS
SYSTOLIC BLOOD PRESSURE: 100 MMHG | BODY MASS INDEX: 27.54 KG/M2 | HEART RATE: 84 BPM | OXYGEN SATURATION: 99 % | RESPIRATION RATE: 18 BRPM | DIASTOLIC BLOOD PRESSURE: 60 MMHG | WEIGHT: 133 LBS | TEMPERATURE: 98 F | HEIGHT: 58.27 IN

## 2024-08-14 DIAGNOSIS — E55.9 HYPOVITAMINOSIS D: ICD-10-CM

## 2024-08-14 DIAGNOSIS — Z31.41 FERTILITY TESTING: ICD-10-CM

## 2024-08-14 DIAGNOSIS — Z87.42 HISTORY OF PCOS: ICD-10-CM

## 2024-08-14 DIAGNOSIS — N83.202 CYST OF LEFT OVARY: ICD-10-CM

## 2024-08-14 DIAGNOSIS — H61.23 BILATERAL IMPACTED CERUMEN: ICD-10-CM

## 2024-08-14 DIAGNOSIS — Z00.00 WELL ADULT EXAM: Primary | ICD-10-CM

## 2024-08-14 DIAGNOSIS — E07.9 THYROID DISEASE: ICD-10-CM

## 2024-08-14 PROCEDURE — 99395 PREV VISIT EST AGE 18-39: CPT | Performed by: FAMILY MEDICINE

## 2024-08-14 PROCEDURE — 3008F BODY MASS INDEX DOCD: CPT | Performed by: FAMILY MEDICINE

## 2024-08-14 PROCEDURE — 3078F DIAST BP <80 MM HG: CPT | Performed by: FAMILY MEDICINE

## 2024-08-14 PROCEDURE — 3074F SYST BP LT 130 MM HG: CPT | Performed by: FAMILY MEDICINE

## 2024-08-14 RX ORDER — CHLORHEXIDINE GLUCONATE ORAL RINSE 1.2 MG/ML
SOLUTION DENTAL
COMMUNITY
Start: 2024-07-12

## 2024-08-14 NOTE — PROGRESS NOTES
Chief Complaint   Patient presents with    Well Adult     Pt is not fasting          HPI  Pt is here for wellness and would like labs and referral for fertility testing. Her  her have been trying to get pregnant for several years. She is also c/o difficulty hearing out of her right ear. Pt has thyroid disease and is agreeable to getting US.     ROS  As per HPI and all other systems reviewed and are negative      Past Medical History:    Anemia    Thyroid disease       No past surgical history on file.    Social History     Socioeconomic History    Marital status:    Tobacco Use    Smoking status: Never     Passive exposure: Never    Smokeless tobacco: Never   Vaping Use    Vaping status: Never Used   Substance and Sexual Activity    Alcohol use: Never    Drug use: Never       No family history on file.     Current Outpatient Medications on File Prior to Visit   Medication Sig Dispense Refill    levothyroxine 88 MCG Oral Tab Take 1 tablet (88 mcg total) by mouth daily. On an empty stomach. 90 tablet 3    chlorhexidine gluconate 0.12 % Mouth/Throat Solution RINSE AND SPIT BY MOUTH ONCE DAILY FOR 60 DAYS. DO NOT SWALLOW. (Patient not taking: Reported on 8/14/2024)      D3-50 1.25 MG (68047 UT) Oral Cap Take 1 capsule (50,000 Units total) by mouth twice a week. (Patient not taking: Reported on 8/14/2024)       No current facility-administered medications on file prior to visit.         Objective  Vitals:    08/14/24 1239   BP: 100/60   Pulse: 84   Resp: 18   Temp: 98.4 °F (36.9 °C)   SpO2: 99%   Weight: 133 lb (60.3 kg)   Height: 4' 10.27\" (1.48 m)     Physical Exam  Constitutional:       Appearance: Normal appearance.   HEENT:      Head: Normocephalic and atraumatic.      Eyes: PERRLA no notable nystagmus     Ears: normal on observation     Nose: Nose normal.      Mouth: Mucous membranes are moist.      Neck: no masses no bruit  Cardiovascular:      Rate and Rhythm: Normal rate and regular rhythm.    Pulmonary:      Effort: Pulmonary effort is normal.      Breath sounds: Normal breath sounds.   Abdominal:      General: Bowel sounds are normal.      Palpations: Abdomen is soft. There is no mass.   Musculoskeletal:         General: Normal range of motion.      Cervical back: Normal range of motion.   Skin:     General: Skin is warm and dry.   Neurological:      General: No focal deficit present.      Mental Status: She is alert and oriented to person, place, and time.   Psychiatric:         Mood and Affect: Mood normal.         Thought Content: Thought content normal.       Assessment and Plan  Paris was seen today for well adult.    Diagnoses and all orders for this visit:    Well adult exam  -     Comp Metabolic Panel (14) [E]; Future  -     CBC W Differential W Platelet [E]; Future  -     Lipid Panel [E]; Future  -     TSH and Free T4 [E]; Future  -     Vitamin D [E]; Future  -     Hemoglobin A1C [E]; Future    Hypovitaminosis D  -     Vitamin D [E]; Future    History of PCOS  -     Hemoglobin A1C [E]; Future  -     Cancel: US PELVIS (TRANSABDOMINAL AND TRANSVAGINAL) (CPT=76856/69556); Future    Cyst of left ovary  -     Cancel: US PELVIS (TRANSABDOMINAL AND TRANSVAGINAL) (CPT=76856/19095); Future    Fertility testing  -     [facility] Referral - [facility] ([place])    Bilateral impacted cerumen    Thyroid disease  -     US THYROID (CPT=76536); Future     Ear washed out by MA      Follow up  No follow-ups on file.      Patient Instructions  There are no Patient Instructions on file for this visit.       Ronit Cole MD

## 2024-08-15 ENCOUNTER — MED REC SCAN ONLY (OUTPATIENT)
Dept: FAMILY MEDICINE CLINIC | Facility: CLINIC | Age: 39
End: 2024-08-15

## 2024-08-16 ENCOUNTER — LAB ENCOUNTER (OUTPATIENT)
Dept: LAB | Age: 39
End: 2024-08-16
Attending: FAMILY MEDICINE
Payer: COMMERCIAL

## 2024-08-16 DIAGNOSIS — Z87.42 HISTORY OF PCOS: ICD-10-CM

## 2024-08-16 DIAGNOSIS — E55.9 HYPOVITAMINOSIS D: ICD-10-CM

## 2024-08-16 DIAGNOSIS — Z00.00 WELL ADULT EXAM: ICD-10-CM

## 2024-08-16 LAB
ALBUMIN SERPL-MCNC: 4.6 G/DL (ref 3.2–4.8)
ALBUMIN/GLOB SERPL: 1.5 {RATIO} (ref 1–2)
ALP LIVER SERPL-CCNC: 128 U/L
ALT SERPL-CCNC: 60 U/L
ANION GAP SERPL CALC-SCNC: 5 MMOL/L (ref 0–18)
AST SERPL-CCNC: 41 U/L (ref ?–34)
BASOPHILS # BLD AUTO: 0.09 X10(3) UL (ref 0–0.2)
BASOPHILS NFR BLD AUTO: 1.4 %
BILIRUB SERPL-MCNC: 0.3 MG/DL (ref 0.3–1.2)
BUN BLD-MCNC: 10 MG/DL (ref 9–23)
CALCIUM BLD-MCNC: 9.3 MG/DL (ref 8.7–10.4)
CHLORIDE SERPL-SCNC: 108 MMOL/L (ref 98–112)
CHOLEST SERPL-MCNC: 161 MG/DL (ref ?–200)
CO2 SERPL-SCNC: 25 MMOL/L (ref 21–32)
CREAT BLD-MCNC: 0.58 MG/DL
EGFRCR SERPLBLD CKD-EPI 2021: 119 ML/MIN/1.73M2 (ref 60–?)
EOSINOPHIL # BLD AUTO: 0.36 X10(3) UL (ref 0–0.7)
EOSINOPHIL NFR BLD AUTO: 5.6 %
ERYTHROCYTE [DISTWIDTH] IN BLOOD BY AUTOMATED COUNT: 13.9 %
EST. AVERAGE GLUCOSE BLD GHB EST-MCNC: 123 MG/DL (ref 68–126)
FASTING PATIENT LIPID ANSWER: YES
FASTING STATUS PATIENT QL REPORTED: YES
GLOBULIN PLAS-MCNC: 3 G/DL (ref 2–3.5)
GLUCOSE BLD-MCNC: 85 MG/DL (ref 70–99)
HBA1C MFR BLD: 5.9 % (ref ?–5.7)
HCT VFR BLD AUTO: 40.6 %
HDLC SERPL-MCNC: 63 MG/DL (ref 40–59)
HGB BLD-MCNC: 12.9 G/DL
IMM GRANULOCYTES # BLD AUTO: 0.01 X10(3) UL (ref 0–1)
IMM GRANULOCYTES NFR BLD: 0.2 %
LDLC SERPL CALC-MCNC: 91 MG/DL (ref ?–100)
LYMPHOCYTES # BLD AUTO: 2.13 X10(3) UL (ref 1–4)
LYMPHOCYTES NFR BLD AUTO: 33.3 %
MCH RBC QN AUTO: 25.3 PG (ref 26–34)
MCHC RBC AUTO-ENTMCNC: 31.8 G/DL (ref 31–37)
MCV RBC AUTO: 79.6 FL
MONOCYTES # BLD AUTO: 0.57 X10(3) UL (ref 0.1–1)
MONOCYTES NFR BLD AUTO: 8.9 %
NEUTROPHILS # BLD AUTO: 3.23 X10 (3) UL (ref 1.5–7.7)
NEUTROPHILS # BLD AUTO: 3.23 X10(3) UL (ref 1.5–7.7)
NEUTROPHILS NFR BLD AUTO: 50.6 %
NONHDLC SERPL-MCNC: 98 MG/DL (ref ?–130)
OSMOLALITY SERPL CALC.SUM OF ELEC: 284 MOSM/KG (ref 275–295)
PLATELET # BLD AUTO: 346 10(3)UL (ref 150–450)
POTASSIUM SERPL-SCNC: 4.1 MMOL/L (ref 3.5–5.1)
PROT SERPL-MCNC: 7.6 G/DL (ref 5.7–8.2)
RBC # BLD AUTO: 5.1 X10(6)UL
SODIUM SERPL-SCNC: 138 MMOL/L (ref 136–145)
T4 FREE SERPL-MCNC: 1.4 NG/DL (ref 0.8–1.7)
TRIGL SERPL-MCNC: 30 MG/DL (ref 30–149)
TSI SER-ACNC: 0.82 MIU/ML (ref 0.55–4.78)
VIT D+METAB SERPL-MCNC: 24.5 NG/ML (ref 30–100)
VLDLC SERPL CALC-MCNC: 5 MG/DL (ref 0–30)
WBC # BLD AUTO: 6.4 X10(3) UL (ref 4–11)

## 2024-08-16 PROCEDURE — 80050 GENERAL HEALTH PANEL: CPT | Performed by: FAMILY MEDICINE

## 2024-08-16 PROCEDURE — 83036 HEMOGLOBIN GLYCOSYLATED A1C: CPT | Performed by: FAMILY MEDICINE

## 2024-08-16 PROCEDURE — 80061 LIPID PANEL: CPT | Performed by: FAMILY MEDICINE

## 2024-08-16 PROCEDURE — 84439 ASSAY OF FREE THYROXINE: CPT | Performed by: FAMILY MEDICINE

## 2024-08-16 PROCEDURE — 82306 VITAMIN D 25 HYDROXY: CPT | Performed by: FAMILY MEDICINE

## 2024-08-19 ENCOUNTER — TELEPHONE (OUTPATIENT)
Dept: FAMILY MEDICINE CLINIC | Facility: CLINIC | Age: 39
End: 2024-08-19

## 2024-08-19 NOTE — TELEPHONE ENCOUNTER
----- Message from Ronit Cole sent at 8/19/2024  1:29 PM CDT -----  Please let patient know that the results of their recent testing shows some slight abnormalities and that I would like to discuss the results with them in person.  Please have patient make an appointment at their convenience.  Thank you

## 2024-08-23 ENCOUNTER — TELEMEDICINE (OUTPATIENT)
Dept: FAMILY MEDICINE CLINIC | Facility: CLINIC | Age: 39
End: 2024-08-23
Payer: COMMERCIAL

## 2024-08-23 DIAGNOSIS — R74.8 ELEVATED LIVER ENZYMES: Primary | ICD-10-CM

## 2024-08-23 DIAGNOSIS — R73.09 ELEVATED HEMOGLOBIN A1C: ICD-10-CM

## 2024-08-23 DIAGNOSIS — D50.0 IRON DEFICIENCY ANEMIA DUE TO CHRONIC BLOOD LOSS: ICD-10-CM

## 2024-08-23 PROCEDURE — 99214 OFFICE O/P EST MOD 30 MIN: CPT | Performed by: FAMILY MEDICINE

## 2024-08-23 NOTE — PROGRESS NOTES
Virtual Telephone Check-In    Paris Velasquez verbally consents to a Virtual/Telephone Check-In visit on 08/23/24.  Patient has been referred to the [facility] website at [email or web address] to review the yearly Consent to Treat document.    Patient understands and accepts financial responsibility for any deductible, co-insurance and/or co-pays associated with this service.    Duration of the service: 10 minutes      Summary of topics discussed: Patient was informed of elevated liver enzymes A1c and anemia.  She does have initial cycles but has not been taking her iron consistently.  Patient agrees to start taking this and repeat blood work in 1 month.    With respect to patient's elevated liver enzymes she is agreeable to doing hepatitis panel and if normal will repeat CMP in 1 month.  If CMP in 1 month shows liver enzymes elevated again she will receive ultrasound and GI referral    Patient's A1c is elevated but because she is anemic this may be falsely abnormal.  Will wait till anemia is corrected and repeat again in 3 months.      Diagnoses and all orders for this visit:    Elevated liver enzymes  -     Hepatitis Panel, Acute (4) [E]; Future    Iron deficiency anemia due to chronic blood loss    Elevated hemoglobin A1c    Will repeat A1c in 3 months CBC in 1 month along with CMP hepatitis panel is negative      Ronit Cole MD

## 2024-08-27 ENCOUNTER — LAB ENCOUNTER (OUTPATIENT)
Dept: LAB | Age: 39
End: 2024-08-27
Attending: FAMILY MEDICINE
Payer: COMMERCIAL

## 2024-08-27 DIAGNOSIS — R74.8 ELEVATED LIVER ENZYMES: ICD-10-CM

## 2024-08-27 LAB
HAV IGM SER QL: NONREACTIVE
HBV CORE IGM SER QL: NONREACTIVE
HBV SURFACE AG SERPL QL IA: NONREACTIVE
HCV AB SERPL QL IA: NONREACTIVE

## 2024-08-27 PROCEDURE — 80074 ACUTE HEPATITIS PANEL: CPT | Performed by: FAMILY MEDICINE

## 2024-08-29 ENCOUNTER — TELEPHONE (OUTPATIENT)
Dept: FAMILY MEDICINE CLINIC | Facility: CLINIC | Age: 39
End: 2024-08-29

## 2024-08-29 DIAGNOSIS — R74.8 ELEVATED LIVER ENZYMES: Primary | ICD-10-CM

## 2024-08-29 NOTE — TELEPHONE ENCOUNTER
----- Message from Ronit Cole sent at 8/29/2024 10:41 AM CDT -----  Hepatitis panel is negative for infection. Please have pt repeat CMP and stop any alcohol or tylenol use. If still high, will have her see GI for further evaluation. Thank you  Written by Ronit Cole MD on 8/29/2024 10:41 AM CDT  Seen by patient Paris Velasquez on 8/29/2024  4:17 PM

## 2024-09-11 ENCOUNTER — HOSPITAL ENCOUNTER (OUTPATIENT)
Dept: ULTRASOUND IMAGING | Age: 39
Discharge: HOME | End: 2024-09-11
Attending: FAMILY MEDICINE
Payer: COMMERCIAL

## 2024-09-11 ENCOUNTER — LAB ENCOUNTER (OUTPATIENT)
Dept: LAB | Age: 39
End: 2024-09-11
Attending: FAMILY MEDICINE
Payer: COMMERCIAL

## 2024-09-11 DIAGNOSIS — E07.9 THYROID DISEASE: ICD-10-CM

## 2024-09-11 DIAGNOSIS — R74.8 ELEVATED LIVER ENZYMES: ICD-10-CM

## 2024-09-11 LAB
ALBUMIN SERPL-MCNC: 4.3 G/DL (ref 3.2–4.8)
ALBUMIN/GLOB SERPL: 1.4 {RATIO} (ref 1–2)
ALP LIVER SERPL-CCNC: 121 U/L
ALT SERPL-CCNC: 50 U/L
ANION GAP SERPL CALC-SCNC: 7 MMOL/L (ref 0–18)
AST SERPL-CCNC: 35 U/L (ref ?–34)
BILIRUB SERPL-MCNC: 0.3 MG/DL (ref 0.3–1.2)
BUN BLD-MCNC: 9 MG/DL (ref 9–23)
CALCIUM BLD-MCNC: 9.5 MG/DL (ref 8.7–10.4)
CHLORIDE SERPL-SCNC: 108 MMOL/L (ref 98–112)
CO2 SERPL-SCNC: 24 MMOL/L (ref 21–32)
CREAT BLD-MCNC: 0.53 MG/DL
EGFRCR SERPLBLD CKD-EPI 2021: 122 ML/MIN/1.73M2 (ref 60–?)
FASTING STATUS PATIENT QL REPORTED: YES
GLOBULIN PLAS-MCNC: 3.1 G/DL (ref 2–3.5)
GLUCOSE BLD-MCNC: 93 MG/DL (ref 70–99)
OSMOLALITY SERPL CALC.SUM OF ELEC: 286 MOSM/KG (ref 275–295)
POTASSIUM SERPL-SCNC: 3.6 MMOL/L (ref 3.5–5.1)
PROT SERPL-MCNC: 7.4 G/DL (ref 5.7–8.2)
SODIUM SERPL-SCNC: 139 MMOL/L (ref 136–145)

## 2024-09-11 PROCEDURE — 80053 COMPREHEN METABOLIC PANEL: CPT | Performed by: FAMILY MEDICINE

## 2024-09-11 PROCEDURE — 76536 US EXAM OF HEAD AND NECK: CPT | Performed by: FAMILY MEDICINE

## 2024-09-13 DIAGNOSIS — R74.8 ELEVATED LIVER ENZYMES: Primary | ICD-10-CM

## 2024-09-16 ENCOUNTER — TELEMEDICINE (OUTPATIENT)
Dept: FAMILY MEDICINE CLINIC | Facility: CLINIC | Age: 39
End: 2024-09-16
Payer: COMMERCIAL

## 2024-09-16 DIAGNOSIS — E04.1 THYROID NODULE: ICD-10-CM

## 2024-09-16 DIAGNOSIS — R74.8 ELEVATED LIVER ENZYMES: Primary | ICD-10-CM

## 2024-09-16 NOTE — PROGRESS NOTES
Telehealth outside of [facility]  Telehealth Verbal Consent   I conducted a telehealth visit with Paris Velasquez today, 09/16/24, which was completed using two-way, real-time interactive audio and video communication. This has been done in good sridevi to provide continuity of care in the best interest of the provider-patient relationship, due to the COVID -19 public health crisis/national emergency where restrictions of face-to-face office visits are ongoing. Every conscious effort was taken to allow for sufficient and adequate time to complete the visit.  The patient was made aware of the limitations of the telehealth visit, including treatment limitations as no physical exam could be performed.  The patient was advised to call 911 or to go to the ER in case there was an emergency.  The patient was also advised of the potential privacy & security concerns related to the telehealth platform.   The patient was made aware of where to find [facility]'s notice of privacy practices, telehealth consent form and other related consent forms and documents.  which are located on the [facility] website. The patient verbally agreed to telehealth consent form, related consents and the risks discussed.    Lastly, the patient confirmed that they were in [place].   Included in this visit, time may have been spent reviewing labs, medications, radiology tests and decision making. Appropriate medical decision-making and tests are ordered as detailed in the plan of care above.  Coding/billing information is submitted for this visit based on complexity of care and/or time spent for the visit.    Paris Velasquez is a 38 year old female.    Chief Complaint   Patient presents with    Test Results       HPI:   Patient is here for patient informed that her thyroid testing was normal and she has a small T3 nodule on her thyroid that can be monitored in a few years.  She has no other questions.  She does need to repeat her liver function test and will be doing this  in early October.  Patient feels otherwise well    Patient Active Problem List   Diagnosis    Iron deficiency anemia due to chronic blood loss     Current Outpatient Medications   Medication Sig Dispense Refill    chlorhexidine gluconate 0.12 % Mouth/Throat Solution RINSE AND SPIT BY MOUTH ONCE DAILY FOR 60 DAYS. DO NOT SWALLOW. (Patient not taking: Reported on 8/14/2024)      levothyroxine 88 MCG Oral Tab Take 1 tablet (88 mcg total) by mouth daily. On an empty stomach. 90 tablet 3    D3-50 1.25 MG (69997 UT) Oral Cap Take 1 capsule (50,000 Units total) by mouth twice a week. (Patient not taking: Reported on 8/14/2024)        Past Medical History:    Anemia    Thyroid disease      Social History:  Social History     Socioeconomic History    Marital status:    Tobacco Use    Smoking status: Never     Passive exposure: Never    Smokeless tobacco: Never   Vaping Use    Vaping status: Never Used   Substance and Sexual Activity    Alcohol use: Never    Drug use: Never     Social Determinants of Health      Received from [facility]    Housing Stability     No family history on file.     Allergies  No Known Allergies    REVIEW OF SYSTEMS:   As per HPI all other systems are negative    EXAM:   GENERAL: well developed, well nourished,in no apparent distress. Pt is speaking in full sentences without any cognitive impairment. Further physical exam could not be performed due to interview being conducted over telemedicine medium      ASSESSMENT AND PLAN:     Encounter Diagnoses   Name Primary?    Elevated liver enzymes Yes    Thyroid nodule    Await repeat CMP and repeat US thyroid in 2 years    No orders of the defined types were placed in this encounter.      Meds & Refills for this Visit:  Requested Prescriptions      No prescriptions requested or ordered in this encounter       Imaging & Consults:  None    No follow-ups on file.  There are no Patient Instructions on file for this visit.      This  note was created by Dragon voice recognition. Errors in content may be related to improper recognition by the system; efforts to review and correct have been done but errors may still exist. Please be advised the primary purpose of this note is for me to communicate medical care. Standard sentence structure is not always used. Medical terminology and medical abbreviations may be used. There may be grammatical, typographical, and automated fill ins that may have errors missed in proofreading.

## 2024-09-17 DIAGNOSIS — E03.9 HYPOTHYROIDISM (ACQUIRED): ICD-10-CM

## 2024-09-17 RX ORDER — LEVOTHYROXINE SODIUM 88 UG/1
88 TABLET ORAL DAILY
Qty: 90 TABLET | Refills: 3 | Status: SHIPPED | OUTPATIENT
Start: 2024-09-17

## 2024-09-17 NOTE — TELEPHONE ENCOUNTER
Thyroid Medication Protocol Desbax8309/17/2024 01:43 PM   Protocol Details TSH in past 12 months    Last TSH value is normal    In person appointment or virtual visit in the past 12 mos or appointment in next 3 mos   Refilled per protocol  levothyroxine 88 MCG Oral Tab   Last refilled on 10/20/23 #90 with 3 rf.  LOV- 8/14/24  Last labs- 9/11/24    Sent to provider.

## 2024-09-19 ENCOUNTER — OFFICE VISIT (OUTPATIENT)
Dept: OBGYN CLINIC | Facility: CLINIC | Age: 39
End: 2024-09-19
Payer: COMMERCIAL

## 2024-09-19 VITALS
HEART RATE: 80 BPM | WEIGHT: 130.5 LBS | SYSTOLIC BLOOD PRESSURE: 112 MMHG | HEIGHT: 58.25 IN | BODY MASS INDEX: 27.02 KG/M2 | DIASTOLIC BLOOD PRESSURE: 76 MMHG

## 2024-09-19 DIAGNOSIS — Z12.4 SCREENING FOR CERVICAL CANCER: ICD-10-CM

## 2024-09-19 DIAGNOSIS — Z31.69 PRE-CONCEPTION COUNSELING: ICD-10-CM

## 2024-09-19 DIAGNOSIS — Z01.419 WELL WOMAN EXAM WITH ROUTINE GYNECOLOGICAL EXAM: Primary | ICD-10-CM

## 2024-09-19 DIAGNOSIS — Z11.51 SCREENING FOR HUMAN PAPILLOMAVIRUS (HPV): ICD-10-CM

## 2024-09-19 PROCEDURE — 87624 HPV HI-RISK TYP POOLED RSLT: CPT | Performed by: NURSE PRACTITIONER

## 2024-09-19 PROCEDURE — 3008F BODY MASS INDEX DOCD: CPT | Performed by: NURSE PRACTITIONER

## 2024-09-19 PROCEDURE — 3074F SYST BP LT 130 MM HG: CPT | Performed by: NURSE PRACTITIONER

## 2024-09-19 PROCEDURE — 99459 PELVIC EXAMINATION: CPT | Performed by: NURSE PRACTITIONER

## 2024-09-19 PROCEDURE — 99395 PREV VISIT EST AGE 18-39: CPT | Performed by: NURSE PRACTITIONER

## 2024-09-19 PROCEDURE — 3078F DIAST BP <80 MM HG: CPT | Performed by: NURSE PRACTITIONER

## 2024-09-19 NOTE — PROGRESS NOTES
Subjective:  Chief Complaint   Patient presents with    Annual     38 year old female  presents for annual.    Pt notes that she and her partner are TTC  Having intercourse infrequently as partner travels  Pt also note menses is irregular    Patient's last menstrual period was 2024 (exact date).  Hx Prior Abnormal Pap: No  Pap Date: 20  Pap Result Notes: wnl  Menarche: 15 (2024  9:10 AM)  Period Cycle (Days): irregular (2024  9:10 AM)  Period Duration (Days): 3-4 days (2024  9:10 AM)  Period Flow: heavy to moderate (2024  9:10 AM)  Use of Birth Control (if yes, specify type): None (2024  9:10 AM)  Hx Prior Abnormal Pap: No (2024  9:10 AM)  Pap Date: 20 (2024  9:10 AM)  Pap Result Notes: wnl (2024  9:10 AM)    Sexually active with male partner  Declines STD testing    Denies family history of breast, ovarian and colon CA.    Feeling safe at home.    Most Recent Immunizations   Administered Date(s) Administered    Covid-19 Vaccine Pfizer 30 mcg/0.3 ml 2021    Covid-19 Vaccine Pfizer Bivalent 30mcg/0.3mL 2022    FLULAVAL 6 months & older 0.5 ml Prefilled syringe (75785) 10/07/2022      reports that she has never smoked. She has never been exposed to tobacco smoke. She has never used smokeless tobacco.   reports no history of alcohol use.    Past Medical History:    Anemia    Thyroid disease     History reviewed. No pertinent surgical history.    Review of Systems:  Pertinent items are noted in the HPI.    Objective:  /76   Pulse 80   Ht 58.25\"   Wt 130 lb 8 oz (59.2 kg)   LMP 2024 (Exact Date)   BMI 27.04 kg/m²    Physical Examination:  General appearance: Well dressed, well nourished in no apparent distress  Neurologic/Psychiatric: Alert and oriented to person, place and time, mood normal, affect appropriate  Head: Normocephalic without obvious deformity, atraumatic  Neck: No thyromegaly, supple, non-tender, no masses, no  adenopathy  Lungs: Clear to auscultation bilaterally, no rales, wheezes or rhonchi  Breasts: Symmetric, non-tender, no masses, lesions, retraction, dimpling or discharge bilaterally, no axillary or supraclavicular lymphadenopathy  Heart: Regular rate and rhythm, no gallops or murmurs  Abdomen: Soft, non-tender, non-distended, no masses, no hepatosplenomegaly, no hernias, no inguinal lymphadenopathy  Pelvic:    External genitalia- Normal, Bartholin's, urethra, skeins glands normal   Vagina- No vaginal lesions, + moderate menstrual blood in vaginal vault   Cervix- No lesions, long/closed, no cervical motion tenderness   Uterus- Normal sized, non-tender, no masses   Adnexa-  Non-tender, no masses  Extremities: Non-tender, full range of motion, no clubbing, cyanosis or edema  Skin:  General inspection- no rashes, lesions or discoloration  Pap smear done    Assessment/Plan:  Normal well-woman exam.  Yearly mammogram at age 41.  Pap smear obtained.    Chaperone present for exam today, Shantell YARBROUGH    Diagnoses and all orders for this visit:    Well woman exam with routine gynecological exam  - self breast exam discussed and encouraged    Screening for cervical cancer  -     ThinPrep PAP Smear; Future  - discussed possibility of cervical cells being obscured by blood    Screening for human papillomavirus (HPV)  -     Hpv Dna  High Risk , Thin Prep Collect; Future    Pre-conception counseling  - discussed starting PNV  - we discussed importance of tracking cycles and OPK  - Discussed timed intercourse with ovulation  - recommend actively trying for 6 months   - we also discussed ANNABEL         Return in about 1 year (around 9/19/2025) for annual well woman exam or sooner if needed.

## 2024-09-20 LAB — HPV E6+E7 MRNA CVX QL NAA+PROBE: NEGATIVE

## 2024-09-25 LAB
.: NORMAL
.: NORMAL

## 2024-12-11 DIAGNOSIS — E03.9 HYPOTHYROIDISM (ACQUIRED): ICD-10-CM

## 2024-12-11 RX ORDER — LEVOTHYROXINE SODIUM 88 UG/1
88 TABLET ORAL DAILY
Qty: 90 TABLET | Refills: 3 | Status: SHIPPED | OUTPATIENT
Start: 2024-12-11

## 2024-12-11 NOTE — TELEPHONE ENCOUNTER
levothyroxine 88 MCG Oral Tab     Thyroid Medication Protocol [id number]/11/2024 02:56 PM   Protocol Details TSH in past 12 months    Last TSH value is normal    In person appointment or virtual visit in the past 12 mos or appointment in next 3 mos   LOV 9/16/2024  Last Px:8/14/2024  Last labs 8/16/2024  Last refill on 9/17/2024, for #90, with 3 refills    No future appointments.

## 2025-02-04 ENCOUNTER — OFFICE VISIT (OUTPATIENT)
Dept: FAMILY MEDICINE CLINIC | Facility: CLINIC | Age: 40
End: 2025-02-04
Payer: COMMERCIAL

## 2025-02-04 VITALS
TEMPERATURE: 98 F | OXYGEN SATURATION: 98 % | RESPIRATION RATE: 18 BRPM | HEART RATE: 91 BPM | SYSTOLIC BLOOD PRESSURE: 98 MMHG | HEIGHT: 59 IN | WEIGHT: 133.63 LBS | DIASTOLIC BLOOD PRESSURE: 62 MMHG | BODY MASS INDEX: 26.94 KG/M2

## 2025-02-04 DIAGNOSIS — Z83.3 FAMILY HISTORY OF DIABETES MELLITUS: ICD-10-CM

## 2025-02-04 DIAGNOSIS — H66.12 CHRONIC TUBOTYMPANIC SUPPURATIVE OTITIS MEDIA OF LEFT EAR: ICD-10-CM

## 2025-02-04 DIAGNOSIS — E03.9 HYPOTHYROIDISM (ACQUIRED): ICD-10-CM

## 2025-02-04 DIAGNOSIS — Z86.2 HISTORY OF ANEMIA: Primary | ICD-10-CM

## 2025-02-04 DIAGNOSIS — H61.21 HEARING LOSS OF RIGHT EAR DUE TO CERUMEN IMPACTION: ICD-10-CM

## 2025-02-04 DIAGNOSIS — Z32.00 POSSIBLE PREGNANCY: ICD-10-CM

## 2025-02-04 LAB
ALBUMIN SERPL-MCNC: 4.7 G/DL (ref 3.2–4.8)
ALBUMIN/GLOB SERPL: 1.6 {RATIO} (ref 1–2)
ALP LIVER SERPL-CCNC: 139 U/L
ALT SERPL-CCNC: 60 U/L
ANION GAP SERPL CALC-SCNC: 9 MMOL/L (ref 0–18)
AST SERPL-CCNC: 33 U/L (ref ?–34)
BASOPHILS # BLD AUTO: 0.1 X10(3) UL (ref 0–0.2)
BASOPHILS NFR BLD AUTO: 1.5 %
BILIRUB SERPL-MCNC: 0.2 MG/DL (ref 0.3–1.2)
BUN BLD-MCNC: 9 MG/DL (ref 9–23)
CALCIUM BLD-MCNC: 8.9 MG/DL (ref 8.7–10.6)
CHLORIDE SERPL-SCNC: 106 MMOL/L (ref 98–112)
CO2 SERPL-SCNC: 27 MMOL/L (ref 21–32)
CREAT BLD-MCNC: 0.53 MG/DL
DEPRECATED HBV CORE AB SER IA-ACNC: 4 NG/ML
EGFRCR SERPLBLD CKD-EPI 2021: 121 ML/MIN/1.73M2 (ref 60–?)
EOSINOPHIL # BLD AUTO: 0.38 X10(3) UL (ref 0–0.7)
EOSINOPHIL NFR BLD AUTO: 5.6 %
ERYTHROCYTE [DISTWIDTH] IN BLOOD BY AUTOMATED COUNT: 13.3 %
EST. AVERAGE GLUCOSE BLD GHB EST-MCNC: 120 MG/DL (ref 68–126)
FASTING STATUS PATIENT QL REPORTED: NO
GLOBULIN PLAS-MCNC: 3 G/DL (ref 2–3.5)
GLUCOSE BLD-MCNC: 95 MG/DL (ref 70–99)
HBA1C MFR BLD: 5.8 % (ref ?–5.7)
HCG SERPL QL: NEGATIVE
HCT VFR BLD AUTO: 37.2 %
HGB BLD-MCNC: 11.6 G/DL
IMM GRANULOCYTES # BLD AUTO: 0.01 X10(3) UL (ref 0–1)
IMM GRANULOCYTES NFR BLD: 0.1 %
IRON SATN MFR SERPL: 7 %
IRON SERPL-MCNC: 28 UG/DL
LYMPHOCYTES # BLD AUTO: 1.95 X10(3) UL (ref 1–4)
LYMPHOCYTES NFR BLD AUTO: 28.9 %
MCH RBC QN AUTO: 24.4 PG (ref 26–34)
MCHC RBC AUTO-ENTMCNC: 31.2 G/DL (ref 31–37)
MCV RBC AUTO: 78.3 FL
MONOCYTES # BLD AUTO: 0.6 X10(3) UL (ref 0.1–1)
MONOCYTES NFR BLD AUTO: 8.9 %
NEUTROPHILS # BLD AUTO: 3.7 X10 (3) UL (ref 1.5–7.7)
NEUTROPHILS # BLD AUTO: 3.7 X10(3) UL (ref 1.5–7.7)
NEUTROPHILS NFR BLD AUTO: 55 %
OSMOLALITY SERPL CALC.SUM OF ELEC: 292 MOSM/KG (ref 275–295)
PLATELET # BLD AUTO: 465 10(3)UL (ref 150–450)
POTASSIUM SERPL-SCNC: 3.8 MMOL/L (ref 3.5–5.1)
PROT SERPL-MCNC: 7.7 G/DL (ref 5.7–8.2)
RBC # BLD AUTO: 4.75 X10(6)UL
SODIUM SERPL-SCNC: 142 MMOL/L (ref 136–145)
TOTAL IRON BINDING CAPACITY: 403 UG/DL (ref 250–425)
TRANSFERRIN SERPL-MCNC: 392 MG/DL (ref 250–380)
TSI SER-ACNC: 0.26 UIU/ML (ref 0.55–4.78)
WBC # BLD AUTO: 6.7 X10(3) UL (ref 4–11)

## 2025-02-04 PROCEDURE — 99214 OFFICE O/P EST MOD 30 MIN: CPT | Performed by: FAMILY MEDICINE

## 2025-02-04 PROCEDURE — 82728 ASSAY OF FERRITIN: CPT | Performed by: FAMILY MEDICINE

## 2025-02-04 PROCEDURE — 3078F DIAST BP <80 MM HG: CPT | Performed by: FAMILY MEDICINE

## 2025-02-04 PROCEDURE — 3074F SYST BP LT 130 MM HG: CPT | Performed by: FAMILY MEDICINE

## 2025-02-04 PROCEDURE — 84703 CHORIONIC GONADOTROPIN ASSAY: CPT | Performed by: FAMILY MEDICINE

## 2025-02-04 PROCEDURE — 80050 GENERAL HEALTH PANEL: CPT | Performed by: FAMILY MEDICINE

## 2025-02-04 PROCEDURE — 82306 VITAMIN D 25 HYDROXY: CPT | Performed by: FAMILY MEDICINE

## 2025-02-04 PROCEDURE — 3008F BODY MASS INDEX DOCD: CPT | Performed by: FAMILY MEDICINE

## 2025-02-04 PROCEDURE — 83550 IRON BINDING TEST: CPT | Performed by: FAMILY MEDICINE

## 2025-02-04 PROCEDURE — 83540 ASSAY OF IRON: CPT | Performed by: FAMILY MEDICINE

## 2025-02-04 PROCEDURE — 83036 HEMOGLOBIN GLYCOSYLATED A1C: CPT | Performed by: FAMILY MEDICINE

## 2025-02-04 NOTE — PROGRESS NOTES
Chief Complaint   Patient presents with    Fatigue     Pt complains of no energy, feeling tired    Other     Pt would like to become pregnant, has been taking pregnancy tests, but have come out negative    Ear Problem     Pt states its hard to hear out of right ear     HPI  Patient is feeling fatigued and is trying to get pregnant and despite doing ovulation kits and ensuring intercourse during this time she is unable to get pregnant.  She would like to get a blood draw for pregnancy testing as she recently ovulated and does not trust the home kits.  She is also having some discomfort in her right ear and difficulty hearing    ROS  As per HPI and all other systems reviewed and are negative      Past Medical History:    Anemia    Thyroid disease       No past surgical history on file.    Social History     Socioeconomic History    Marital status:    Tobacco Use    Smoking status: Never     Passive exposure: Never    Smokeless tobacco: Never   Vaping Use    Vaping status: Never Used   Substance and Sexual Activity    Alcohol use: Never    Drug use: Never    Sexual activity: Yes     Partners: Male       No family history on file.     Medications Ordered Prior to Encounter[1]      Objective  Vitals:    02/04/25 1412   BP: 98/62   Pulse: 91   Resp: 18   Temp: 98 °F (36.7 °C)   TempSrc: Temporal   SpO2: 98%   Weight: 133 lb 9.6 oz (60.6 kg)   Height: 4' 11\" (1.499 m)     Physical Exam  Constitutional:       Appearance: Normal appearance.   HEENT:      Head: Normocephalic and atraumatic.      Eyes: Left TM is retracted with superlative discharge and air-fluid level noted.  Right TM is obscured by wax     Nose: Nose normal.      Mouth: Mucous membranes are moist.      Neck: no masses no bruit  Cardiovascular:      Rate and Rhythm: Normal rate and regular rhythm.   Pulmonary:      Effort: Pulmonary effort is normal.      Breath sounds: Normal breath sounds.   Musculoskeletal:         General: Normal range of motion.       Cervical back: Normal range of motion.   Skin:     General: Skin is warm and dry.   Neurological:      General: No focal deficit present.      Mental Status: She is alert and oriented to person, place, and time.   Psychiatric:         Mood and Affect: Mood normal.         Thought Content: Thought content normal.       Assessment and Plan  Paris was seen today for fatigue, other and ear problem.    Diagnoses and all orders for this visit:    History of anemia  -     Comp Metabolic Panel (14) [E]; Future  -     CBC With Differential With Platelet; Future  -     Iron And Tibc; Future  -     Ferritin; Future    Possible pregnancy  -     HCG, BETA SUBUNIT, QUAL [8435] [Q]; Future    Family history of diabetes mellitus  -     Hemoglobin A1C [E]; Future    Hypothyroidism (acquired)  -     TSH [E]; Future           Follow up  No follow-ups on file.      Patient Instructions  There are no Patient Instructions on file for this visit.       Ronit Cole MD       This note was created by [employer] voice recognition. Errors in content may be related to improper recognition by the system; efforts to review and correct have been done but errors may still exist. Please be advised the primary purpose of this note is for me to communicate medical care. Standard sentence structure is not always used. Medical terminology and medical abbreviations may be used. There may be grammatical, typographical, and automated fill ins that may have errors missed in proofreading.          [1]   Current Outpatient Medications on File Prior to Visit   Medication Sig Dispense Refill    levothyroxine 88 MCG Oral Tab Take 1 tablet (88 mcg total) by mouth daily. On an empty stomach. 90 tablet 3     No current facility-administered medications on file prior to visit.

## 2025-02-05 DIAGNOSIS — R74.8 ELEVATED ALKALINE PHOSPHATASE MEASUREMENT: Primary | ICD-10-CM

## 2025-02-05 LAB — VIT D+METAB SERPL-MCNC: 28.6 NG/ML (ref 30–100)

## 2025-02-06 ENCOUNTER — TELEPHONE (OUTPATIENT)
Dept: FAMILY MEDICINE CLINIC | Facility: CLINIC | Age: 40
End: 2025-02-06

## 2025-02-06 NOTE — TELEPHONE ENCOUNTER
Returned patient's call - explained Dr. Gonzales would like to review her lab results with an office visit.  Future Appointments   Date Time Provider Department Center   2/7/2025  9:45 AM Ronit Cole MD [facility]     Decreased ferritin -4  Elevated Alk phosphate  Decreased Vitamin D - 28.6  TSH - 0.263  A1C - 5.8

## 2025-02-06 NOTE — TELEPHONE ENCOUNTER
PATIENT IS RETURNING MARTÍNEZ CALL REGARDING LAB RESULTS.  I INFORMED PATIENT NURSE NOT IN BUT WILL HAVE ANOTHER NURSE LOOK AT DR CHAU NOTES AND CALL HER BACK.

## 2025-02-07 ENCOUNTER — TELEMEDICINE (OUTPATIENT)
Dept: FAMILY MEDICINE CLINIC | Facility: CLINIC | Age: 40
End: 2025-02-07
Payer: COMMERCIAL

## 2025-02-07 DIAGNOSIS — E55.9 HYPOVITAMINOSIS D: Primary | ICD-10-CM

## 2025-02-07 DIAGNOSIS — Z86.2 HISTORY OF ANEMIA: ICD-10-CM

## 2025-02-07 DIAGNOSIS — R73.9 ELEVATED BLOOD SUGAR: ICD-10-CM

## 2025-02-07 DIAGNOSIS — R74.8 ELEVATED ALKALINE PHOSPHATASE LEVEL: ICD-10-CM

## 2025-02-07 DIAGNOSIS — E03.9 HYPOTHYROIDISM (ACQUIRED): ICD-10-CM

## 2025-02-07 PROCEDURE — 98005 SYNCH AUDIO-VIDEO EST LOW 20: CPT | Performed by: FAMILY MEDICINE

## 2025-02-07 RX ORDER — LEVOTHYROXINE SODIUM 75 UG/1
75 TABLET ORAL
Qty: 90 TABLET | Refills: 0 | Status: SHIPPED | OUTPATIENT
Start: 2025-02-07

## 2025-02-07 NOTE — PROGRESS NOTES
Telehealth outside of [facility]  Telehealth Verbal Consent   I conducted a telehealth visit with Paris Velasquez today, 02/07/25, which was completed using two-way, real-time interactive audio and video communication. This has been done in good sridevi to provide continuity of care in the best interest of the provider-patient relationship, due to the COVID -19 public health crisis/national emergency where restrictions of face-to-face office visits are ongoing. Every conscious effort was taken to allow for sufficient and adequate time to complete the visit.  The patient was made aware of the limitations of the telehealth visit, including treatment limitations as no physical exam could be performed.  The patient was advised to call 911 or to go to the ER in case there was an emergency.  The patient was also advised of the potential privacy & security concerns related to the telehealth platform.   The patient was made aware of where to find [facility]'s notice of privacy practices, telehealth consent form and other related consent forms and documents.  which are located on the [facility] website. The patient verbally agreed to telehealth consent form, related consents and the risks discussed.    Lastly, the patient confirmed that they were in [place].   Included in this visit, time may have been spent reviewing labs, medications, radiology tests and decision making. Appropriate medical decision-making and tests are ordered as detailed in the plan of care above.  Coding/billing information is submitted for this visit based on complexity of care and/or time spent for the visit.    Paris Velasquez is a 39 year old female.    Chief Complaint   Patient presents with    Test Results       HPI:   Patient is here for follow-up on recent blood work.  She was informed that she has anemia vitamin D deficiency, excessive thyroid medication use and prediabetes.  Patient advised on diet to improve some of her numbers with respect to her vitamin D iron  deficiency and elevated blood sugars and patient voices understanding and agreement.  She is instructed to take 2000 units of vitamin D daily for 3 months along with an iron supplement and prenatal vitamin as she is trying to get pregnant.    With respect to the thyroid were going to lower it from 88 mcg to 75 mcg and repeat the TSH in 3 months.  Patient voices understanding agreement    Patient Active Problem List   Diagnosis    Iron deficiency anemia due to chronic blood loss     Current Outpatient Medications   Medication Sig Dispense Refill    levothyroxine 75 MCG Oral Tab Take 1 tablet (75 mcg total) by mouth before breakfast. 90 tablet 0    levothyroxine 88 MCG Oral Tab Take 1 tablet (88 mcg total) by mouth daily. On an empty stomach. 90 tablet 3      Past Medical History:    Anemia    Thyroid disease      Social History:  Social History     Socioeconomic History    Marital status:    Tobacco Use    Smoking status: Never     Passive exposure: Never    Smokeless tobacco: Never   Vaping Use    Vaping status: Never Used   Substance and Sexual Activity    Alcohol use: Never    Drug use: Never    Sexual activity: Yes     Partners: Male     Social Drivers of Health     Food Insecurity: No Food Insecurity (2/4/2025)    NCSS - Food Insecurity     Worried About Running Out of Food in the Last Year: No     Ran Out of Food in the Last Year: No   Transportation Needs: No Transportation Needs (2/4/2025)    NCSS - Transportation     Lack of Transportation: No   Housing Stability: Not At Risk (2/4/2025)    NCSS - Housing/Utilities     Has Housing: Yes     Worried About Losing Housing: No     Unable to Get Utilities: No     No family history on file.     Allergies  Allergies[1]    REVIEW OF SYSTEMS:   As per HPI all other systems are negative    EXAM:   GENERAL: well developed, well nourished,in no apparent distress. Pt is speaking in full sentences without any cognitive impairment. Further physical exam could not be  performed due to interview being conducted over telemedicine medium      ASSESSMENT AND PLAN:     Encounter Diagnoses   Name Primary?    Hypovitaminosis D Yes    Elevated blood sugar     Elevated alkaline phosphatase level     Hypothyroidism (acquired)     History of anemia        Orders Placed This Encounter   Procedures    CBC With Differential With Platelet    Iron And Tibc    Ferritin    Hemoglobin A1C    Vitamin D [E]    Comp Metabolic Panel (14) [E]    TSH [E]       Meds & Refills for this Visit:  Requested Prescriptions     Signed Prescriptions Disp Refills    levothyroxine 75 MCG Oral Tab 90 tablet 0     Sig: Take 1 tablet (75 mcg total) by mouth before breakfast.     Plan of care as per HPI  Imaging & Consults:  None    No follow-ups on file.  There are no Patient Instructions on file for this visit.      This note was created by trinket voice recognition. Errors in content may be related to improper recognition by the system; efforts to review and correct have been done but errors may still exist. Please be advised the primary purpose of this note is for me to communicate medical care. Standard sentence structure is not always used. Medical terminology and medical abbreviations may be used. There may be grammatical, typographical, and automated fill ins that may have errors missed in proofreading.          [1] No Known Allergies

## 2025-05-03 DIAGNOSIS — E03.9 HYPOTHYROIDISM (ACQUIRED): ICD-10-CM

## 2025-05-03 NOTE — TELEPHONE ENCOUNTER
LEVOTHYROXINE 75 MCG TABLET   Pt failed refill protocol for the following reasons:    Thyroid Medication Protocol Imdyck9905/03/2025 07:15 AM   Protocol Details Last TSH value is normal    TSH in past 12 months    In person appointment or virtual visit in the past 12 mos or appointment in next 3 mos    Medication is active on med list      Last refill: 2/7/2025, for #90, 0 refill  Last appt: 2/7/2025  Last Px: 8/14/2024  Next appt: No future appointments.      Forward to Dr. Yamel Cole, please advise on refills. Thank you.

## 2025-05-05 RX ORDER — LEVOTHYROXINE SODIUM 75 UG/1
75 TABLET ORAL
Qty: 90 TABLET | Refills: 0 | Status: SHIPPED | OUTPATIENT
Start: 2025-05-05

## 2025-05-06 ENCOUNTER — OFFICE VISIT (OUTPATIENT)
Dept: OBGYN CLINIC | Facility: CLINIC | Age: 40
End: 2025-05-06
Payer: COMMERCIAL

## 2025-05-06 VITALS
BODY MASS INDEX: 26.69 KG/M2 | DIASTOLIC BLOOD PRESSURE: 70 MMHG | HEART RATE: 88 BPM | WEIGHT: 132.38 LBS | HEIGHT: 59 IN | SYSTOLIC BLOOD PRESSURE: 102 MMHG

## 2025-05-06 DIAGNOSIS — N92.1 MENORRHAGIA WITH IRREGULAR CYCLE: Primary | ICD-10-CM

## 2025-05-06 LAB
CONTROL LINE PRESENT WITH A CLEAR BACKGROUND (YES/NO): YES YES/NO
KIT LOT #: NORMAL NUMERIC
PREGNANCY TEST, URINE: NEGATIVE

## 2025-05-06 PROCEDURE — 81025 URINE PREGNANCY TEST: CPT | Performed by: NURSE PRACTITIONER

## 2025-05-06 PROCEDURE — 99214 OFFICE O/P EST MOD 30 MIN: CPT | Performed by: NURSE PRACTITIONER

## 2025-05-06 PROCEDURE — 3008F BODY MASS INDEX DOCD: CPT | Performed by: NURSE PRACTITIONER

## 2025-05-06 PROCEDURE — 3074F SYST BP LT 130 MM HG: CPT | Performed by: NURSE PRACTITIONER

## 2025-05-06 PROCEDURE — 3078F DIAST BP <80 MM HG: CPT | Performed by: NURSE PRACTITIONER

## 2025-05-06 NOTE — PATIENT INSTRUCTIONS
IVF 1  Dr. Naun Maloney  [address] [place] [address]  Phone: [phone]    [facility] [address]  [place] [address]  Phone: [phone]

## 2025-05-06 NOTE — PROGRESS NOTES
Gyne note       S: patient is a 39 year old yo  here for irregular menses.    She has a history or irregular menses which may regular at times. She also has a history of thyroid disease which is not well controlled as of more recently. She had a low TSH with her last lab in February so she had her Levothyroxine dose changed. She also had an elevated hemoglobin A1C. Her Hemoglobin was borderline but had low iron studies.    She has been bleeding intermittently since her 2025 menses, She also had 1 day of bleeding on 3/27/2025 which was when her menses was due. Her bleeding has been watery to light period like and brown to red at times.     Her previous few cycles had been somewhat regular lasting 4 days.    In  she did try to conceive. She saw a fertility clinic and did IUI unsuccessfully. They are interested in IVF.    Review of Systems:  General: denies fevers, chills, fatigue and malaise.     O:/70   Pulse 88   Ht 59\"   Wt 132 lb 6 oz (60 kg)   LMP 2025 (Exact Date)   BMI 26.74 kg/m²   Gen NAD     GYNE/: External Genitalia: Normal appearing, no lesions. Urethral meatus appear wnl, no abnormal discharge or lesions noted.                                Vagina: normal pink mucosa, no lesions, scant to small blood noted.                      Uterus: AV, mobile, non tender, normal size                     Cervix: nulliparous, no lesions                     Adnexa: non tender, no masses, normal size             A/P:  1. Menorrhagia with irregular cycle  - US PELVIS W EV (CPT=76856/35025); Future  - Urine Preg Test [42929]    I advised she consider attempting IVF soon, contact an [facility] clinic in her preferred coverage of her insurance

## 2025-05-13 ENCOUNTER — LABORATORY ENCOUNTER (OUTPATIENT)
Dept: LAB | Age: 40
End: 2025-05-13
Attending: FAMILY MEDICINE
Payer: COMMERCIAL

## 2025-05-13 DIAGNOSIS — E03.9 HYPOTHYROIDISM (ACQUIRED): ICD-10-CM

## 2025-05-13 DIAGNOSIS — Z86.2 HISTORY OF ANEMIA: ICD-10-CM

## 2025-05-13 DIAGNOSIS — R74.8 ELEVATED ALKALINE PHOSPHATASE LEVEL: ICD-10-CM

## 2025-05-13 DIAGNOSIS — R74.8 ELEVATED LIVER ENZYMES: ICD-10-CM

## 2025-05-13 DIAGNOSIS — R73.9 ELEVATED BLOOD SUGAR: ICD-10-CM

## 2025-05-13 DIAGNOSIS — E55.9 HYPOVITAMINOSIS D: ICD-10-CM

## 2025-05-13 LAB
ALBUMIN SERPL-MCNC: 4.7 G/DL (ref 3.2–4.8)
ALBUMIN/GLOB SERPL: 1.7 {RATIO} (ref 1–2)
ALP LIVER SERPL-CCNC: 110 U/L (ref 37–98)
ALT SERPL-CCNC: 52 U/L (ref 10–49)
ANION GAP SERPL CALC-SCNC: 11 MMOL/L (ref 0–18)
AST SERPL-CCNC: 31 U/L (ref ?–34)
BASOPHILS # BLD AUTO: 0.07 X10(3) UL (ref 0–0.2)
BASOPHILS NFR BLD AUTO: 1 %
BILIRUB SERPL-MCNC: 0.3 MG/DL (ref 0.3–1.2)
BUN BLD-MCNC: 6 MG/DL (ref 9–23)
CALCIUM BLD-MCNC: 9.2 MG/DL (ref 8.7–10.6)
CHLORIDE SERPL-SCNC: 108 MMOL/L (ref 98–112)
CO2 SERPL-SCNC: 21 MMOL/L (ref 21–32)
CREAT BLD-MCNC: 0.57 MG/DL (ref 0.55–1.02)
DEPRECATED HBV CORE AB SER IA-ACNC: 7 NG/ML (ref 50–306)
EGFRCR SERPLBLD CKD-EPI 2021: 119 ML/MIN/1.73M2 (ref 60–?)
EOSINOPHIL # BLD AUTO: 0.4 X10(3) UL (ref 0–0.7)
EOSINOPHIL NFR BLD AUTO: 5.6 %
ERYTHROCYTE [DISTWIDTH] IN BLOOD BY AUTOMATED COUNT: 15.7 %
EST. AVERAGE GLUCOSE BLD GHB EST-MCNC: 120 MG/DL (ref 68–126)
FASTING STATUS PATIENT QL REPORTED: YES
GLOBULIN PLAS-MCNC: 2.8 G/DL (ref 2–3.5)
GLUCOSE BLD-MCNC: 70 MG/DL (ref 70–99)
HBA1C MFR BLD: 5.8 % (ref ?–5.7)
HCT VFR BLD AUTO: 39.8 % (ref 35–48)
HGB BLD-MCNC: 12.6 G/DL (ref 12–16)
IMM GRANULOCYTES # BLD AUTO: 0.02 X10(3) UL (ref 0–1)
IMM GRANULOCYTES NFR BLD: 0.3 %
IRON SATN MFR SERPL: 19 % (ref 15–50)
IRON SERPL-MCNC: 82 UG/DL (ref 50–170)
LYMPHOCYTES # BLD AUTO: 2.16 X10(3) UL (ref 1–4)
LYMPHOCYTES NFR BLD AUTO: 30.5 %
MCH RBC QN AUTO: 25 PG (ref 26–34)
MCHC RBC AUTO-ENTMCNC: 31.7 G/DL (ref 31–37)
MCV RBC AUTO: 79 FL (ref 80–100)
MONOCYTES # BLD AUTO: 0.56 X10(3) UL (ref 0.1–1)
MONOCYTES NFR BLD AUTO: 7.9 %
NEUTROPHILS # BLD AUTO: 3.87 X10 (3) UL (ref 1.5–7.7)
NEUTROPHILS # BLD AUTO: 3.87 X10(3) UL (ref 1.5–7.7)
NEUTROPHILS NFR BLD AUTO: 54.7 %
OSMOLALITY SERPL CALC.SUM OF ELEC: 286 MOSM/KG (ref 275–295)
PLATELET # BLD AUTO: 313 10(3)UL (ref 150–450)
POTASSIUM SERPL-SCNC: 3.8 MMOL/L (ref 3.5–5.1)
PROT SERPL-MCNC: 7.5 G/DL (ref 5.7–8.2)
RBC # BLD AUTO: 5.04 X10(6)UL (ref 3.8–5.3)
SODIUM SERPL-SCNC: 140 MMOL/L (ref 136–145)
TOTAL IRON BINDING CAPACITY: 437 UG/DL (ref 250–425)
TRANSFERRIN SERPL-MCNC: 360 MG/DL (ref 250–380)
TSI SER-ACNC: 0.54 UIU/ML (ref 0.55–4.78)
VIT D+METAB SERPL-MCNC: 32.6 NG/ML (ref 30–100)
WBC # BLD AUTO: 7.1 X10(3) UL (ref 4–11)

## 2025-05-13 PROCEDURE — 83036 HEMOGLOBIN GLYCOSYLATED A1C: CPT | Performed by: FAMILY MEDICINE

## 2025-05-19 ENCOUNTER — OFFICE VISIT (OUTPATIENT)
Dept: FAMILY MEDICINE CLINIC | Facility: CLINIC | Age: 40
End: 2025-05-19
Payer: COMMERCIAL

## 2025-05-19 VITALS
RESPIRATION RATE: 18 BRPM | BODY MASS INDEX: 26.61 KG/M2 | WEIGHT: 132 LBS | HEART RATE: 74 BPM | DIASTOLIC BLOOD PRESSURE: 74 MMHG | HEIGHT: 59.06 IN | OXYGEN SATURATION: 100 % | SYSTOLIC BLOOD PRESSURE: 116 MMHG | TEMPERATURE: 98 F

## 2025-05-19 DIAGNOSIS — E55.9 HYPOVITAMINOSIS D: ICD-10-CM

## 2025-05-19 DIAGNOSIS — R74.8 ELEVATED ALKALINE PHOSPHATASE LEVEL: ICD-10-CM

## 2025-05-19 DIAGNOSIS — R73.02 IGT (IMPAIRED GLUCOSE TOLERANCE): ICD-10-CM

## 2025-05-19 DIAGNOSIS — Z00.00 WELL ADULT EXAM: ICD-10-CM

## 2025-05-19 DIAGNOSIS — N93.8 DUB (DYSFUNCTIONAL UTERINE BLEEDING): ICD-10-CM

## 2025-05-19 DIAGNOSIS — D50.0 ANEMIA DUE TO CHRONIC BLOOD LOSS: ICD-10-CM

## 2025-05-19 DIAGNOSIS — E03.9 HYPOTHYROIDISM (ACQUIRED): Primary | ICD-10-CM

## 2025-05-19 DIAGNOSIS — E03.9 HYPOTHYROIDISM (ACQUIRED): ICD-10-CM

## 2025-05-19 RX ORDER — LEVOTHYROXINE SODIUM 50 UG/1
50 TABLET ORAL
Qty: 90 TABLET | Refills: 0 | Status: SHIPPED | OUTPATIENT
Start: 2025-05-19 | End: 2025-05-19

## 2025-05-19 RX ORDER — LEVOTHYROXINE SODIUM 50 UG/1
50 TABLET ORAL
Qty: 90 TABLET | Refills: 0 | Status: SHIPPED
Start: 2025-05-19

## 2025-05-19 NOTE — PROGRESS NOTES
The following individual(s) verbally consented to be recorded using ambient AI listening technology and understand that they can each withdraw their consent to this listening technology at any point by asking the clinician to turn off or pause the recording:    Patient name: Paris Velasquez  Additional names:  none        Chief Complaint   Patient presents with    Follow - Up       History of Present Illness  Paris Velasquez is a 39 year old female with hypothyroidism and iron deficiency anemia who presents for follow-up on recent lab results.    Her blood sugar levels have remained stable, but there is a need for further improvement. She continues to monitor her levels closely to prevent progression to diabetes.    Her iron levels have increased but are still slightly low. She continues to take iron supplements and will have her levels rechecked in three months. She feels less tired recently, indicating some improvement in her anemia symptoms. She experiences irregular menstrual periods, which she attributes to her low hemoglobin levels. She takes multiple iron pills daily, especially during her menstrual cycle, to manage her symptoms. She also experiences tiredness and back pain during her periods.    Her levothyroxine dosage is being changed from 75 mcg to 50 mcg. She is concerned about her thyroid levels being low, which may be contributing to her menstrual irregularity.    She is currently taking vitamin D3 and prenatal vitamins, and her vitamin D levels are reported to be good. She plans to continue these supplements.    No headaches or dizziness. Nausea and tiredness associated with her menstrual periods, as well as back pain.      Past Medical History[1]    Past Surgical History[2]    Social Hx on file[3]    Family History[4]     Medications Ordered Prior to Encounter[5]      Objective  Vitals:    05/19/25 1327   BP: 116/74   Pulse: 74   Resp: 18   Temp: 97.9 °F (36.6 °C)   TempSrc: Temporal   SpO2: 100%    Weight: 132 lb (59.9 kg)   Height: 4' 11.06\" (1.5 m)         Body mass index is 26.61 kg/m².    Physical Exam  Constitutional:       Appearance: Normal appearance.   HEENT:      Head: Normocephalic and atraumatic.      Eyes: PERRLA no notable nystagmus     Ears: normal on observation     Nose: Nose normal.      Mouth: Mucous membranes are moist.      Neck: no masses no bruit  Cardiovascular:      Rate and Rhythm: Normal rate and regular rhythm.   Pulmonary:      Effort: Pulmonary effort is normal.      Breath sounds: Normal breath sounds.   Abdominal:      General: Bowel sounds are normal.      Palpations: Abdomen is soft. There is no mass.   Musculoskeletal:         General: Normal range of motion.      Cervical back: Normal range of motion.   Skin:     General: Skin is warm and dry.   Neurological:      General: No focal deficit present.      Mental Status: She is alert and oriented to person, place, and time.   Psychiatric:         Mood and Affect: Mood normal.         Thought Content: Thought content normal.       Assessment and Plan  Assessment & Plan  Iron deficiency anemia  Iron levels improved but slightly low. Menstrual irregularity may contribute to anemia.  - Continue iron supplementation.  - Recheck iron levels, ferritin, and blood counts in three months.  - Ensure intake of 2-3 iron tablets during menstruation.    Hypothyroidism  Thyroid levels indicate excessive medication use. Reduced levothyroxine dosage due to potential contribution to menstrual irregularity.  - Reduce levothyroxine dosage from 75 mcg to 50 mcg daily for three months.  - Reassess thyroid function in three months.  - Advise taking levothyroxine at least 45 minutes to 1 hour before eating.    Prediabetes  Blood glucose levels require further improvement.  - Encourage lifestyle modifications to improve blood glucose levels.  - Recheck blood glucose levels in three months.      ICD-10-CM    1. Hypothyroidism (acquired)  E03.9  levothyroxine 50 MCG Oral Tab     TSH [E]      2. Hypovitaminosis D  E55.9 Improved cont vit supplement      3. Elevated alkaline phosphatase level  R74.8 Comp Metabolic Panel (14)      4. DUB (dysfunctional uterine bleeding)  N93.8 OBG Referral - [facility] ([facility])      5. Anemia due to chronic blood loss  D50.0 OBG Referral - [facility] ([facility])     Iron And Tibc     Ferritin      6. IGT (impaired glucose tolerance)  R73.02 Hemoglobin A1C      7. Well adult exam  Z00.00 TSH [E]     CBC With Differential With Platelet     Comp Metabolic Panel (14)     Lipid Panel            Follow up  No follow-ups on file.      Patient Instructions  There are no Patient Instructions on file for this visit.       Ronit Cole MD       This note was created by Dragon voice recognition and or LocalView transcription service. Errors in content may be related to improper recognition by the system; efforts to review and correct have been done but errors may still exist. Please be advised the primary purpose of this note is for me to communicate medical care. Standard sentence structure is not always used. Medical terminology and medical abbreviations may be used. There may be grammatical, typographical, and automated fill ins that may have errors missed in proofreading.        [1]   Past Medical History:   Anemia    Thyroid disease   [2] No past surgical history on file.  [3]   Social History  Socioeconomic History    Marital status:    Tobacco Use    Smoking status: Never     Passive exposure: Never    Smokeless tobacco: Never   Vaping Use    Vaping status: Never Used   Substance and Sexual Activity    Alcohol use: Never    Drug use: Never    Sexual activity: Yes     Partners: Male   [4] No family history on file.  [5]   No current outpatient medications on file prior to visit.     No current facility-administered medications on file prior to visit.

## 2025-05-22 ENCOUNTER — HOSPITAL ENCOUNTER (OUTPATIENT)
Dept: ULTRASOUND IMAGING | Age: 40
Discharge: HOME | End: 2025-05-22
Attending: NURSE PRACTITIONER
Payer: COMMERCIAL

## 2025-05-22 DIAGNOSIS — N92.1 MENORRHAGIA WITH IRREGULAR CYCLE: ICD-10-CM

## 2025-05-22 PROCEDURE — 76830 TRANSVAGINAL US NON-OB: CPT | Performed by: NURSE PRACTITIONER

## 2025-05-22 PROCEDURE — 76856 US EXAM PELVIC COMPLETE: CPT | Performed by: NURSE PRACTITIONER

## 2025-05-29 ENCOUNTER — TELEPHONE (OUTPATIENT)
Dept: OBGYN CLINIC | Facility: CLINIC | Age: 40
End: 2025-05-29

## 2025-05-29 NOTE — TELEPHONE ENCOUNTER
Established patient has been bleeding for a month. Not sure if she has to be re evaluated since last OV. Please advise

## 2025-05-29 NOTE — TELEPHONE ENCOUNTER
Voicemail left for patient to return call  [employer] ([facility]) message sent.              HARI Bennett  5/22/2025  5:24 PM CDT       I would have her see an MD to discuss the cysts in the meantime regardless     HARI Bennett  5/22/2025  5:22 PM CDT       If she doesn't plan to pursue IVF soon I would have her follow up with an MD to discuss if any additional testing would be recommended. She may need follow up ultrasound. She can take oral tranexamic acid with the onset of her menses to slow them down if she is interested in a non- hormonal option but it won't regulate any irregular bleeding patterns.

## 2025-06-03 ENCOUNTER — OFFICE VISIT (OUTPATIENT)
Dept: OBGYN CLINIC | Facility: CLINIC | Age: 40
End: 2025-06-03
Payer: COMMERCIAL

## 2025-06-03 VITALS
DIASTOLIC BLOOD PRESSURE: 62 MMHG | WEIGHT: 131 LBS | HEART RATE: 70 BPM | BODY MASS INDEX: 26 KG/M2 | SYSTOLIC BLOOD PRESSURE: 96 MMHG

## 2025-06-03 DIAGNOSIS — E28.2 PCOS (POLYCYSTIC OVARIAN SYNDROME): Primary | ICD-10-CM

## 2025-06-03 PROCEDURE — 3078F DIAST BP <80 MM HG: CPT | Performed by: STUDENT IN AN ORGANIZED HEALTH CARE EDUCATION/TRAINING PROGRAM

## 2025-06-03 PROCEDURE — 99214 OFFICE O/P EST MOD 30 MIN: CPT | Performed by: STUDENT IN AN ORGANIZED HEALTH CARE EDUCATION/TRAINING PROGRAM

## 2025-06-03 PROCEDURE — 3074F SYST BP LT 130 MM HG: CPT | Performed by: STUDENT IN AN ORGANIZED HEALTH CARE EDUCATION/TRAINING PROGRAM

## 2025-06-03 RX ORDER — TRANEXAMIC ACID 650 MG/1
1300 TABLET ORAL EVERY 8 HOURS
Qty: 90 TABLET | Refills: 2 | Status: SHIPPED | OUTPATIENT
Start: 2025-06-03

## 2025-06-03 RX ORDER — MEDROXYPROGESTERONE ACETATE 10 MG
10 TABLET ORAL DAILY
Qty: 10 TABLET | Refills: 0 | Status: SHIPPED | OUTPATIENT
Start: 2025-06-03 | End: 2025-06-13

## 2025-06-03 NOTE — PROGRESS NOTES
[facility]  Obstetrics and Gynecology  Follow Up Progress Note    Subjective:     Paris Velasquez is a 39 year old  female who was last seen in office 2025 for irregular menses and presents today to discuss daily bleeding plus ovarian cysts. The patient reports she has chronic h/o irregular menses in setting of hypothyroidism (on medication) and pre-diabetes. She now c/o daily bleeding for past 1 month, changing a panty liner 3-4x per day. Denies passage of large clots or painful cramping.     Of note, she is trying for pregnancy and was referred to [facility] but has not scheduled consultation with them to date. Had unsuccessful IUI in . She states, at home pregnancy tests have been positive in the past, but she will lose the pregnancy in a few days with negative urine pregnancy tests. Concerned about \"weak uterus\" and wants to know why she can't conceive.    Reviewed pelvic US today, which was notable for many small follicular cysts c/w likely PCOS in setting of irregular periods and difficulty with conceiving. This may play a role in patient's borderline Hgb A1c which could point to a metabolic syndrome. She also has a 5.4 x 4.4 cm complex LEFT ovarian cyst c/w likely hemorrhagic cyst vs endometrioma.    Review of Systems:  General: no complaints per category. See HPI for additional information.   Breast: no complaints per category. See HPI for additional information.   Respiratory: no complaints per category. See HPI for additional information.   Cardiovascular: no complaints per category. See HPI for additional information.   GI: no complaints per category. See HPI for additional information.   : no complaints per category. See HPI for additional information.   Heme: no complaints per category. See HPI for additional information.     OB History    Para Term  AB Living   0 0 0 0 0 0   SAB IAB Ectopic Multiple Live Births   0 0 0 0 0         Gyne History:  Menarche: 15  Period Cycle  (Days): irregular  Period Duration (Days): 3-4 days  Period Flow: heavy to moderate  Use of Birth Control (if yes, specify type): None  Hx Prior Abnormal Pap: No  Pap Date: 09/19/24  Pap Result Notes: [facility]  Patient's last menstrual period was 03/27/2025 (exact date).      Meds:  Medications Ordered Prior to Encounter[1]    All:  Allergies[2]    PMH:  Past Medical History[3]    PSH:  Past Surgical History[4]      Objective:     Vitals:    06/03/25 1221   BP: 96/62   Pulse: 70   Weight: 131 lb (59.4 kg)         Body mass index is 26.41 kg/m².    General: AAO.NAD.   CVS exam: normal peripheral perfusion  Chest: non-labored breathing, no tachypnea   Breast: deferred  Abdominal exam: soft, nontender, nondistended  Pelvic exam: deferred  Ext: non-tender, no edema    Labs:  05/13/2025:   Hgb 12.6, TSH 0.536 (LOW), Hgb A1c 5.8 (borderline), ferritin 7 (LOW), ALT 52 (borderline high)    Imaging:  Pelvic US 5/22/2025  Narrative   PROCEDURE:  US PELVIS W EV (CPT=76856,54455)     COMPARISON:  None.     INDICATIONS:  N92.1 Menorrhagia with irregular cycle     TECHNIQUE:  Pelvic ultrasound using transabdominal and endovaginal technique.  Transvaginal ultrasound was used to better evaluate adnexal and endometrial detail.     PATIENT STATED HISTORY: (As transcribed by Technologist)           FINDINGS:                UTERUS:  6.77 cm x 3.78 cm x 5.26 cm    Endometrium Thickness: 1.26 cm    The uterus is anteverted in configuration.  No uterine fibroids identified.  Homogeneous thickening of the endometrium.  No discrete mass identified.  RIGHT OVARY:  2.98 cm x 2.16 cm x 2.78 cm    The right ovary appears normal in size, shape, and echogenicity.  Multiple ovarian follicles.    LEFT OVARY:  6.64 cm x 3.59 cm x 5.19 cm    The left ovary appears normal in size, shape, and echogenicity.  Multiple ovarian follicles with complex cystic lesion measuring 5.4 x 3.4 x 4.4 cm demonstrating internal debris and few thin septations.  CUL-DE-SAC:   No free pelvic fluid  OTHER:  Negative.     Impression   CONCLUSION:       1. Anteverted uterus without fibroids.     2. Homogeneous thickening of the endometrium measuring up to 1.3 cm, within normal limits for a premenopausal female and likely reflecting secretory phase of menstrual cycle.     3. Complex left intra-ovarian lesion (5.4 x 3.4 x 4.4 cm), suspected hemorrhagic cyst.        LOCATION:  [facility]        Dictated by (CST): Marija Harris MD on 2025 at 2:32 PM      Finalized by (CST): Marija Harris MD on 2025 at 2:36 PM            Assessment:     Paris Velasquez is a 39 year old  female who presents for AUB        Plan:     Problem List Items Addressed This Visit       PCOS (polycystic ovarian syndrome) - Primary    Relevant Medications    medroxyPROGESTERone Acetate (PROVERA) 10 MG Oral Tab    Other Relevant Orders    US TRANSVAG/ABDOMINAL [facility] ONLY    17-Hydroxypregnenolone    TSH W Reflex To Free T4    Prolactin    Dehydroepiandrosterone Sulfate    Testosterone Total         AUB  - Pt with h/o irregular menses in setting of hypothyroidism and pre-diabetes; now with daily bleeding for past 1 month  - Pelvic US notable for bilateral multiple small follicles and larger 5cm hemorrhagic cyst in LEFT ovary  - clinical picture c/w suspected PCOS  -d/w patient management options including conservative versus medical versus surgical including risks, benefits and alternatives  -after further discussion, patient desires fertility therefore will proceed with short-term medical management with Provera to suppress uterine bleeding  - Rx for PO Provera 10mg daily x10 days sent to pharmacy  - advised pt to start Lysteda as needed for heavy menses when she has return of normal menstrual cycle  - Rx for PO Lysteda 1.3 g TID for up to 5 days as needed for heavier flow with menses  - Repeat Pelvic US ordered in 3 months to reassess ovarian cyst  - PCOS panel ordered  - d/w pt [facility] referral and advised pt to schedule  consultation soon as her insurance does cover IVF treatment  - d/w pt to have partner complete semen analysis prior to [facility] consultation      All of the findings and plan were discussed with the patient.  She notes understanding and agrees with the plan of care.  All questions were answered to the best of my ability at this time.      Total patient time was 30 minutes in evaluation, consultation, and coordination of care. This included face to face and non-face to face actions. The patient's questions and concerns were addressed.       RTC in 3 months or sooner if needed     Eun Ng DO   [facility] - OBGYN       Discussed with patient that there will not be further notification of normal or benign results other than receiving results on [facility]. A [employer] message or telephone call will be placed by the physician and/or office staff if results are abnormal.     Note to patient and family   The 21st Century Cures Act makes medical notes available to patients in the interest of transparency.  However, please be advised that this is a medical document.  It is intended as lodb-dx-frue communication.  It is written and medical language may contain abbreviations or verbiage that are technical and unfamiliar.  It may appear blunt or direct.  Medical documents are intended to carry relevant information, facts as evident, and the clinical opinion of the practitioner.        This note could include assistance by Dragon voice recognition. Errors in content may be related to improper recognition by the system; efforts to review and correct have been done but errors may still exist.          [1]   Current Outpatient Medications on File Prior to Visit   Medication Sig Dispense Refill    levothyroxine 50 MCG Oral Tab Take 1 tablet (50 mcg total) by mouth before breakfast. 90 tablet 0     No current facility-administered medications on file prior to visit.   [2] No Known Allergies  [3]   Past Medical History:   Anemia    Thyroid  disease   [4] History reviewed. No pertinent surgical history.

## 2025-06-03 NOTE — PATIENT INSTRUCTIONS
Polycystic Ovary Syndrome  Polycystic ovary syndrome (PCOS) causes harmless, small cysts in the ovaries and other symptoms. PCOS is caused by certain hormones being out of balance. The word syndrome means a group of symptoms. Women with PCOS may have no periods, irregular periods, or very long or heavy periods.   Your ovaries  Women store their eggs in their ovaries. Each egg is in a capsule called a follicle. Normally during the reproductive years, one follicle grows to make a mature egg each month. This egg is released during ovulation and the follicle dissolves.     Hormones out of balance  With PCOS, the hormones that control ovulation are out of balance. These include estrogen, progesterone, and androgen. As a result, ovulation may not occur. Instead, the follicle stays enlarged. This is a fluid-filled sac called a cyst. Over time, the ovaries fill with many small cysts. This is why they are called poly or many cystic ovaries. In some women, the ovaries also make too much androgen.   Symptoms of PCOS  Women with PCOS may also have one or more of these symptoms:   Acne  Hair growth on the face and other parts of the body  Patches of thick, velvety, dark skin (acanthosis nigricans), often on the neck or groin area  Trouble getting pregnant (fertility problems)  Weight gain, often around the waist   Women with PCOS are also at greater risk of having cancer of the uterine lining, diabetes, and heart disease.   [employer] last reviewed this educational content on 8/1/2022  This information is for informational purposes only. This is not intended to be a substitute for professional medical advice, diagnosis, or treatment. Always seek the advice and follow the directions from your physician or other qualified health care provider.  © 0511-7917 [employer]. All rights reserved. This information is not intended as a substitute for professional medical care. Always follow your healthcare professional's  instructions.

## 2025-06-17 ENCOUNTER — TELEPHONE (OUTPATIENT)
Dept: OBGYN CLINIC | Facility: CLINIC | Age: 40
End: 2025-06-17

## 2025-06-17 NOTE — TELEPHONE ENCOUNTER
Pt called and wants to be seen today or this week, woud not say why, I told her we dont have anything she wants to talk to a nurse

## 2025-06-18 RX ORDER — MEDROXYPROGESTERONE ACETATE 10 MG
10 TABLET ORAL 2 TIMES DAILY
Qty: 28 TABLET | Refills: 0 | Status: SHIPPED | OUTPATIENT
Start: 2025-06-18 | End: 2025-07-02

## 2025-06-18 NOTE — TELEPHONE ENCOUNTER
Spoke to patient.  Patient completed 10 day course of Provera on 6/12/25 but her bleeding has not stopped or slowed down.  Currently changing a large size pad 3-4 times daily.  Patient would like to know if there is anything else that can be offered to stop the bleeding.  ER precautions provided for heavy bleeding or symptoms of anemia.  Patient verbalized understanding.     Routed to Dr. Ng- please advise

## 2025-06-18 NOTE — TELEPHONE ENCOUNTER
Patient states she tried the Lysteda for one day- took 2 doses eight hours apart and had a terrible headache and vomiting.  When she tried taking the Lysteda again- she had the same reaction.  Patient is not able to tolerate Lysteda.

## 2025-06-18 NOTE — TELEPHONE ENCOUNTER
Patient notified of new prescription for Provera.  Instructions reviewed.  ER precautions given for heavy bleeding.  Patient verbalized understanding.

## 2025-06-26 ENCOUNTER — TELEPHONE (OUTPATIENT)
Dept: OBGYN CLINIC | Facility: CLINIC | Age: 40
End: 2025-06-26

## 2025-06-26 NOTE — TELEPHONE ENCOUNTER
Returned call.  [facility] requests a call from Dr. Ng directly to speak with Nurse Practitioner Ayanna at [phone].

## 2025-06-26 NOTE — TELEPHONE ENCOUNTER
Kaity the  from E. at [facility] called asking to speak with dr gN or her clinical staff. The patient is in Emergency room right now. She can be reached at [phone] in regards to Nurse Practitioner Ayanna. Please advise

## 2025-07-11 ENCOUNTER — TELEPHONE (OUTPATIENT)
Dept: OBGYN CLINIC | Facility: CLINIC | Age: 40
End: 2025-07-11

## 2025-07-11 NOTE — TELEPHONE ENCOUNTER
Last office visit: 6/3/2025  Follow-up recommended: RTC in 3 months  Last refill date:   Medication Quantity Refills Start End   medroxyPROGESTERone Acetate (PROVERA) 10 MG Oral Tab () 28 tablet 0 2025   Sig:   Take 1 tablet (10 mg total) by mouth in the morning and 1 tablet (10 mg total) before bedtime. Do all this for 14 days.       Next appt: 2025    Routed to Dr. Ng- please advise on refill request

## 2025-07-16 ENCOUNTER — TELEPHONE (OUTPATIENT)
Dept: OBGYN CLINIC | Facility: CLINIC | Age: 40
End: 2025-07-16

## 2025-07-16 NOTE — TELEPHONE ENCOUNTER
Patient has some questions about her medication. She doesn't know if she should continue using that or not because she has 3 days no bleeding. Please advise

## 2025-07-16 NOTE — TELEPHONE ENCOUNTER
Patient unsure if she should take Provera. No bleeding for last 10 days. Instructed to start taking when bleeding returns. Patient verbalized understanding.

## 2025-08-16 DIAGNOSIS — E03.9 HYPOTHYROIDISM (ACQUIRED): ICD-10-CM

## 2025-08-19 ENCOUNTER — LAB ENCOUNTER (OUTPATIENT)
Dept: LAB | Age: 40
End: 2025-08-19
Attending: FAMILY MEDICINE

## 2025-08-19 DIAGNOSIS — Z00.00 WELL ADULT EXAM: ICD-10-CM

## 2025-08-19 DIAGNOSIS — R74.8 ELEVATED ALKALINE PHOSPHATASE LEVEL: ICD-10-CM

## 2025-08-19 DIAGNOSIS — E03.9 HYPOTHYROIDISM (ACQUIRED): ICD-10-CM

## 2025-08-19 DIAGNOSIS — R73.02 IGT (IMPAIRED GLUCOSE TOLERANCE): ICD-10-CM

## 2025-08-19 DIAGNOSIS — D50.0 ANEMIA DUE TO CHRONIC BLOOD LOSS: ICD-10-CM

## 2025-08-19 LAB
ALBUMIN SERPL-MCNC: 4.6 G/DL (ref 3.2–4.8)
ALBUMIN/GLOB SERPL: 1.6 (ref 1–2)
ALP LIVER SERPL-CCNC: 123 U/L (ref 37–98)
ALT SERPL-CCNC: 58 U/L (ref 10–49)
ANION GAP SERPL CALC-SCNC: 9 MMOL/L (ref 0–18)
AST SERPL-CCNC: 33 U/L (ref ?–34)
BASOPHILS # BLD AUTO: 0.08 X10(3) UL (ref 0–0.2)
BASOPHILS NFR BLD AUTO: 1.2 %
BILIRUB SERPL-MCNC: 0.3 MG/DL (ref 0.3–1.2)
BUN BLD-MCNC: 5 MG/DL (ref 9–23)
CALCIUM BLD-MCNC: 9.3 MG/DL (ref 8.7–10.6)
CHLORIDE SERPL-SCNC: 105 MMOL/L (ref 98–112)
CHOLEST SERPL-MCNC: 150 MG/DL (ref ?–200)
CO2 SERPL-SCNC: 25 MMOL/L (ref 21–32)
CREAT BLD-MCNC: 0.64 MG/DL (ref 0.55–1.02)
DEPRECATED HBV CORE AB SER IA-ACNC: 3 NG/ML (ref 50–306)
EGFRCR SERPLBLD CKD-EPI 2021: 116 ML/MIN/1.73M2 (ref 60–?)
EOSINOPHIL # BLD AUTO: 0.42 X10(3) UL (ref 0–0.7)
EOSINOPHIL NFR BLD AUTO: 6.1 %
ERYTHROCYTE [DISTWIDTH] IN BLOOD BY AUTOMATED COUNT: 14.2 %
EST. AVERAGE GLUCOSE BLD GHB EST-MCNC: 108 MG/DL (ref 68–126)
FASTING PATIENT LIPID ANSWER: YES
FASTING STATUS PATIENT QL REPORTED: YES
GLOBULIN PLAS-MCNC: 2.8 G/DL (ref 2–3.5)
GLUCOSE BLD-MCNC: 83 MG/DL (ref 70–99)
HBA1C MFR BLD: 5.4 % (ref ?–5.7)
HCT VFR BLD AUTO: 34.8 % (ref 35–48)
HDLC SERPL-MCNC: 66 MG/DL (ref 40–59)
HGB BLD-MCNC: 10.3 G/DL (ref 12–16)
IMM GRANULOCYTES # BLD AUTO: 0.01 X10(3) UL (ref 0–1)
IMM GRANULOCYTES NFR BLD: 0.1 %
IRON SATN MFR SERPL: 4 % (ref 15–50)
IRON SERPL-MCNC: 20 UG/DL (ref 50–170)
LDLC SERPL CALC-MCNC: 76 MG/DL (ref ?–100)
LYMPHOCYTES # BLD AUTO: 2.2 X10(3) UL (ref 1–4)
LYMPHOCYTES NFR BLD AUTO: 32 %
MCH RBC QN AUTO: 22.9 PG (ref 26–34)
MCHC RBC AUTO-ENTMCNC: 29.6 G/DL (ref 31–37)
MCV RBC AUTO: 77.3 FL (ref 80–100)
MONOCYTES # BLD AUTO: 0.71 X10(3) UL (ref 0.1–1)
MONOCYTES NFR BLD AUTO: 10.3 %
NEUTROPHILS # BLD AUTO: 3.45 X10 (3) UL (ref 1.5–7.7)
NEUTROPHILS # BLD AUTO: 3.45 X10(3) UL (ref 1.5–7.7)
NEUTROPHILS NFR BLD AUTO: 50.3 %
NONHDLC SERPL-MCNC: 84 MG/DL (ref ?–130)
OSMOLALITY SERPL CALC.SUM OF ELEC: 284 MOSM/KG (ref 275–295)
PLATELET # BLD AUTO: 455 10(3)UL (ref 150–450)
POTASSIUM SERPL-SCNC: 3.5 MMOL/L (ref 3.5–5.1)
PROT SERPL-MCNC: 7.4 G/DL (ref 5.7–8.2)
RBC # BLD AUTO: 4.5 X10(6)UL (ref 3.8–5.3)
SODIUM SERPL-SCNC: 139 MMOL/L (ref 136–145)
TOTAL IRON BINDING CAPACITY: 488 UG/DL (ref 250–425)
TRANSFERRIN SERPL-MCNC: 409 MG/DL (ref 250–380)
TRIGL SERPL-MCNC: 34 MG/DL (ref 30–149)
TSI SER-ACNC: 10.39 UIU/ML (ref 0.55–4.78)
VLDLC SERPL CALC-MCNC: 5 MG/DL (ref 0–30)
WBC # BLD AUTO: 6.9 X10(3) UL (ref 4–11)

## 2025-08-19 PROCEDURE — 83036 HEMOGLOBIN GLYCOSYLATED A1C: CPT | Performed by: FAMILY MEDICINE

## 2025-08-19 PROCEDURE — 80050 GENERAL HEALTH PANEL: CPT | Performed by: FAMILY MEDICINE

## 2025-08-19 PROCEDURE — 80061 LIPID PANEL: CPT | Performed by: FAMILY MEDICINE

## 2025-08-19 PROCEDURE — 82728 ASSAY OF FERRITIN: CPT | Performed by: FAMILY MEDICINE

## 2025-08-19 PROCEDURE — 83550 IRON BINDING TEST: CPT | Performed by: FAMILY MEDICINE

## 2025-08-19 PROCEDURE — 83540 ASSAY OF IRON: CPT | Performed by: FAMILY MEDICINE

## 2025-08-20 RX ORDER — LEVOTHYROXINE SODIUM 50 UG/1
50 TABLET ORAL
Qty: 30 TABLET | Refills: 0 | Status: SHIPPED | OUTPATIENT
Start: 2025-08-20 | End: 2025-08-20

## 2025-08-20 RX ORDER — LEVOTHYROXINE SODIUM 75 UG/1
75 TABLET ORAL
Qty: 90 TABLET | Refills: 0 | Status: SHIPPED | OUTPATIENT
Start: 2025-08-20

## (undated) NOTE — LETTER
[facility]egas   [address] [place] [address]           Dear Mirian Bradley records indicate that you have outstanding lab work and or testing that was ordered for you and has not yet been completed:  Lab Frequency Next Occurrence   TSH W REFLEX TO FREE T4 Once 02/06/2023      To provide you with the best possible care, please complete these orders at your earliest convenience. If you have recently completed these orders please disregard this letter. If you have any questions please call the office at [phone].      Thank you,     [facility]